# Patient Record
Sex: FEMALE | Race: WHITE | NOT HISPANIC OR LATINO | Employment: OTHER | RURAL
[De-identification: names, ages, dates, MRNs, and addresses within clinical notes are randomized per-mention and may not be internally consistent; named-entity substitution may affect disease eponyms.]

---

## 2017-02-14 ENCOUNTER — HISTORICAL (OUTPATIENT)
Dept: ADMINISTRATIVE | Facility: HOSPITAL | Age: 57
End: 2017-02-14

## 2017-02-15 LAB
LAB AP CLINICAL INFORMATION: NORMAL
LAB AP COMMENTS: NORMAL
LAB AP DIAGNOSIS - HISTORICAL: NORMAL
LAB AP GROSS PATHOLOGY - HISTORICAL: NORMAL
LAB AP SPECIMEN SUBMITTED - HISTORICAL: NORMAL

## 2021-01-18 ENCOUNTER — HISTORICAL (OUTPATIENT)
Dept: ADMINISTRATIVE | Facility: HOSPITAL | Age: 61
End: 2021-01-18

## 2021-08-13 DIAGNOSIS — M25.551 RIGHT HIP PAIN: Primary | ICD-10-CM

## 2021-08-18 ENCOUNTER — CLINICAL SUPPORT (OUTPATIENT)
Dept: REHABILITATION | Facility: HOSPITAL | Age: 61
End: 2021-08-18
Payer: COMMERCIAL

## 2021-08-18 DIAGNOSIS — M25.551 RIGHT HIP PAIN: ICD-10-CM

## 2021-08-18 DIAGNOSIS — M70.61 GREATER TROCHANTERIC BURSITIS, RIGHT: Primary | ICD-10-CM

## 2021-08-18 DIAGNOSIS — M76.31 ILIOTIBIAL BAND TENDONITIS OF RIGHT SIDE: ICD-10-CM

## 2021-08-18 DIAGNOSIS — M53.3 SACROILIAC JOINT DYSFUNCTION OF RIGHT SIDE: ICD-10-CM

## 2021-08-18 PROCEDURE — 97161 PT EVAL LOW COMPLEX 20 MIN: CPT | Mod: PN

## 2021-08-18 PROCEDURE — 97110 THERAPEUTIC EXERCISES: CPT | Mod: PN

## 2021-08-20 ENCOUNTER — CLINICAL SUPPORT (OUTPATIENT)
Dept: REHABILITATION | Facility: HOSPITAL | Age: 61
End: 2021-08-20
Payer: COMMERCIAL

## 2021-08-20 DIAGNOSIS — M53.3 SACROILIAC JOINT DYSFUNCTION OF RIGHT SIDE: ICD-10-CM

## 2021-08-20 DIAGNOSIS — M25.551 HIP PAIN, RIGHT: Primary | ICD-10-CM

## 2021-08-20 PROCEDURE — 97035 APP MDLTY 1+ULTRASOUND EA 15: CPT | Mod: PN,CQ

## 2021-08-20 PROCEDURE — 97110 THERAPEUTIC EXERCISES: CPT | Mod: PN,CQ

## 2021-08-26 ENCOUNTER — CLINICAL SUPPORT (OUTPATIENT)
Dept: REHABILITATION | Facility: HOSPITAL | Age: 61
End: 2021-08-26
Payer: COMMERCIAL

## 2021-08-26 DIAGNOSIS — M53.3 SACROILIAC JOINT DYSFUNCTION OF RIGHT SIDE: Primary | ICD-10-CM

## 2021-08-26 PROCEDURE — 97110 THERAPEUTIC EXERCISES: CPT | Mod: PN,CQ

## 2021-08-26 PROCEDURE — 97035 APP MDLTY 1+ULTRASOUND EA 15: CPT | Mod: PN,CQ

## 2021-08-27 ENCOUNTER — CLINICAL SUPPORT (OUTPATIENT)
Dept: REHABILITATION | Facility: HOSPITAL | Age: 61
End: 2021-08-27
Payer: COMMERCIAL

## 2021-08-27 DIAGNOSIS — M53.3 SACROILIAC JOINT DYSFUNCTION OF RIGHT SIDE: Primary | ICD-10-CM

## 2021-08-27 PROCEDURE — 97035 APP MDLTY 1+ULTRASOUND EA 15: CPT | Mod: PN,CQ

## 2021-08-27 PROCEDURE — 97110 THERAPEUTIC EXERCISES: CPT | Mod: PN,CQ

## 2021-08-31 ENCOUNTER — CLINICAL SUPPORT (OUTPATIENT)
Dept: REHABILITATION | Facility: HOSPITAL | Age: 61
End: 2021-08-31
Payer: COMMERCIAL

## 2021-08-31 DIAGNOSIS — M53.3 SACROILIAC JOINT DYSFUNCTION OF RIGHT SIDE: Primary | ICD-10-CM

## 2021-08-31 PROCEDURE — 97110 THERAPEUTIC EXERCISES: CPT | Mod: PN,CQ

## 2021-08-31 PROCEDURE — 97035 APP MDLTY 1+ULTRASOUND EA 15: CPT | Mod: PN,CQ

## 2021-09-02 ENCOUNTER — CLINICAL SUPPORT (OUTPATIENT)
Dept: REHABILITATION | Facility: HOSPITAL | Age: 61
End: 2021-09-02
Payer: COMMERCIAL

## 2021-09-02 DIAGNOSIS — M76.31 ILIOTIBIAL BAND TENDONITIS OF RIGHT SIDE: ICD-10-CM

## 2021-09-02 DIAGNOSIS — M25.551 RIGHT HIP PAIN: ICD-10-CM

## 2021-09-02 DIAGNOSIS — M70.61 GREATER TROCHANTERIC BURSITIS, RIGHT: ICD-10-CM

## 2021-09-02 DIAGNOSIS — M53.3 SACROILIAC JOINT DYSFUNCTION OF RIGHT SIDE: ICD-10-CM

## 2021-09-02 PROCEDURE — 97110 THERAPEUTIC EXERCISES: CPT | Mod: PN

## 2021-09-02 PROCEDURE — 97035 APP MDLTY 1+ULTRASOUND EA 15: CPT | Mod: PN

## 2021-09-08 ENCOUNTER — CLINICAL SUPPORT (OUTPATIENT)
Dept: REHABILITATION | Facility: HOSPITAL | Age: 61
End: 2021-09-08
Payer: COMMERCIAL

## 2021-09-08 DIAGNOSIS — M53.3 SACROILIAC JOINT DYSFUNCTION OF RIGHT SIDE: ICD-10-CM

## 2021-09-08 PROCEDURE — 97110 THERAPEUTIC EXERCISES: CPT | Mod: PN,CQ

## 2021-09-08 PROCEDURE — 97035 APP MDLTY 1+ULTRASOUND EA 15: CPT | Mod: PN,CQ

## 2021-09-09 ENCOUNTER — CLINICAL SUPPORT (OUTPATIENT)
Dept: REHABILITATION | Facility: HOSPITAL | Age: 61
End: 2021-09-09
Payer: COMMERCIAL

## 2021-09-09 DIAGNOSIS — M53.3 SACROILIAC JOINT DYSFUNCTION OF RIGHT SIDE: ICD-10-CM

## 2021-09-09 PROCEDURE — 97110 THERAPEUTIC EXERCISES: CPT | Mod: PN,CQ

## 2021-09-09 PROCEDURE — 97035 APP MDLTY 1+ULTRASOUND EA 15: CPT | Mod: PN,CQ

## 2021-10-07 PROBLEM — M53.3 SACROILIAC JOINT DYSFUNCTION OF RIGHT SIDE: Status: RESOLVED | Noted: 2021-08-18 | Resolved: 2021-10-07

## 2022-01-19 ENCOUNTER — HOSPITAL ENCOUNTER (OUTPATIENT)
Dept: RADIOLOGY | Facility: HOSPITAL | Age: 62
Discharge: HOME | End: 2022-01-19
Payer: COMMERCIAL

## 2022-01-19 VITALS — BODY MASS INDEX: 33.32 KG/M2 | WEIGHT: 200 LBS | HEIGHT: 65 IN

## 2022-01-19 DIAGNOSIS — Z12.31 VISIT FOR SCREENING MAMMOGRAM: ICD-10-CM

## 2022-01-19 PROCEDURE — 77067 SCR MAMMO BI INCL CAD: CPT | Mod: TC

## 2022-10-24 ENCOUNTER — APPOINTMENT (OUTPATIENT)
Dept: LAB | Facility: HOSPITAL | Age: 62
End: 2022-10-24
Attending: NURSE PRACTITIONER
Payer: COMMERCIAL

## 2022-10-24 ENCOUNTER — HOSPITAL ENCOUNTER (OUTPATIENT)
Dept: RADIOLOGY | Facility: HOSPITAL | Age: 62
Discharge: HOME | End: 2022-10-24
Attending: NURSE PRACTITIONER
Payer: COMMERCIAL

## 2022-10-24 DIAGNOSIS — R10.9 PAIN, ABDOMINAL: ICD-10-CM

## 2022-10-24 DIAGNOSIS — R11.10 VOMITING: ICD-10-CM

## 2022-10-24 PROCEDURE — 76705 ECHO EXAM OF ABDOMEN: CPT | Mod: TC

## 2022-10-25 ENCOUNTER — OFFICE VISIT (OUTPATIENT)
Dept: SURGERY | Facility: CLINIC | Age: 62
End: 2022-10-25
Payer: COMMERCIAL

## 2022-10-25 ENCOUNTER — HOSPITAL ENCOUNTER (INPATIENT)
Facility: HOSPITAL | Age: 62
LOS: 4 days | Discharge: HOME | DRG: 418 | End: 2022-10-29
Attending: FAMILY MEDICINE | Admitting: STUDENT IN AN ORGANIZED HEALTH CARE EDUCATION/TRAINING PROGRAM
Payer: COMMERCIAL

## 2022-10-25 DIAGNOSIS — K80.00 CALCULUS OF GALLBLADDER WITH ACUTE CHOLECYSTITIS WITHOUT OBSTRUCTION: ICD-10-CM

## 2022-10-25 DIAGNOSIS — N17.9 ACUTE RENAL FAILURE, UNSPECIFIED ACUTE RENAL FAILURE TYPE: ICD-10-CM

## 2022-10-25 DIAGNOSIS — K80.20 SYMPTOMATIC CHOLELITHIASIS: ICD-10-CM

## 2022-10-25 DIAGNOSIS — R11.2 NAUSEA AND VOMITING, UNSPECIFIED VOMITING TYPE: Primary | ICD-10-CM

## 2022-10-25 DIAGNOSIS — N17.9 ACUTE RENAL FAILURE, UNSPECIFIED ACUTE RENAL FAILURE TYPE: Primary | ICD-10-CM

## 2022-10-25 DIAGNOSIS — Z01.818 PRE-OP EVALUATION: ICD-10-CM

## 2022-10-25 DIAGNOSIS — R07.9 CHEST PAIN: ICD-10-CM

## 2022-10-25 PROBLEM — R19.7 DIARRHEA: Status: ACTIVE | Noted: 2022-10-25

## 2022-10-25 PROBLEM — E87.1 HYPONATREMIA: Status: ACTIVE | Noted: 2022-10-25

## 2022-10-25 PROBLEM — E66.9 OBESITY (BMI 30-39.9): Status: ACTIVE | Noted: 2022-10-25

## 2022-10-25 LAB
ALBUMIN SERPL BCP-MCNC: 3.7 G/DL (ref 3.5–5)
ALBUMIN/GLOB SERPL: 0.7 {RATIO}
ALP SERPL-CCNC: 131 U/L (ref 50–130)
ALT SERPL W P-5'-P-CCNC: 31 U/L (ref 13–56)
ANION GAP SERPL CALCULATED.3IONS-SCNC: 24 MMOL/L (ref 7–16)
AST SERPL W P-5'-P-CCNC: 22 U/L (ref 15–37)
BACTERIA #/AREA URNS HPF: ABNORMAL /HPF
BASOPHILS # BLD AUTO: 0.06 K/UL (ref 0–0.2)
BASOPHILS NFR BLD AUTO: 1 % (ref 0–1)
BASOPHILS NFR BLD MANUAL: 1 % (ref 0–1)
BILIRUB SERPL-MCNC: 0.3 MG/DL (ref ?–1.2)
BILIRUB UR QL STRIP: NEGATIVE
BUN SERPL-MCNC: 63 MG/DL (ref 7–18)
BUN/CREAT SERPL: 9 (ref 6–20)
CALCIUM SERPL-MCNC: 9.1 MG/DL (ref 8.5–10.1)
CHLORIDE SERPL-SCNC: 90 MMOL/L (ref 98–107)
CLARITY UR: ABNORMAL
CO2 SERPL-SCNC: 22 MMOL/L (ref 21–32)
COLOR UR: YELLOW
CREAT SERPL-MCNC: 7.15 MG/DL (ref 0.55–1.02)
CREAT UR-MCNC: 264 MG/DL (ref 28–219)
DIFFERENTIAL METHOD BLD: ABNORMAL
EGFR (NO RACE VARIABLE) (RUSH/TITUS): 6 ML/MIN/1.73M²
EOSINOPHIL # BLD AUTO: 0.01 K/UL (ref 0–0.5)
EOSINOPHIL NFR BLD AUTO: 0.2 % (ref 1–4)
ERYTHROCYTE [DISTWIDTH] IN BLOOD BY AUTOMATED COUNT: 12.4 % (ref 11.5–14.5)
GLOBULIN SER-MCNC: 5.4 G/DL (ref 2–4)
GLUCOSE SERPL-MCNC: 151 MG/DL (ref 74–106)
GLUCOSE UR STRIP-MCNC: NORMAL MG/DL
HCT VFR BLD AUTO: 47.4 % (ref 38–47)
HGB BLD-MCNC: 15.8 G/DL (ref 12–16)
IMM GRANULOCYTES # BLD AUTO: 0.04 K/UL (ref 0–0.04)
IMM GRANULOCYTES NFR BLD: 0.7 % (ref 0–0.4)
KETONES UR STRIP-SCNC: NEGATIVE MG/DL
LEUKOCYTE ESTERASE UR QL STRIP: NEGATIVE
LYMPHOCYTES # BLD AUTO: 1.01 K/UL (ref 1–4.8)
LYMPHOCYTES NFR BLD AUTO: 16.7 % (ref 27–41)
LYMPHOCYTES NFR BLD MANUAL: 13 % (ref 27–41)
MAGNESIUM SERPL-MCNC: 1.8 MG/DL (ref 1.7–2.3)
MCH RBC QN AUTO: 28.2 PG (ref 27–31)
MCHC RBC AUTO-ENTMCNC: 33.3 G/DL (ref 32–36)
MCV RBC AUTO: 84.5 FL (ref 80–96)
METAMYELOCYTES NFR BLD MANUAL: 1 %
MONOCYTES # BLD AUTO: 0.53 K/UL (ref 0–0.8)
MONOCYTES NFR BLD AUTO: 8.8 % (ref 2–6)
MONOCYTES NFR BLD MANUAL: 6 % (ref 2–6)
MPC BLD CALC-MCNC: 9.6 FL (ref 9.4–12.4)
MUCOUS, UA: ABNORMAL /LPF
NEUTROPHILS # BLD AUTO: 4.38 K/UL (ref 1.8–7.7)
NEUTROPHILS NFR BLD AUTO: 72.6 % (ref 53–65)
NEUTS BAND NFR BLD MANUAL: 23 % (ref 1–5)
NEUTS SEG NFR BLD MANUAL: 56 % (ref 50–62)
NITRITE UR QL STRIP: NEGATIVE
NRBC # BLD AUTO: 0 X10E3/UL
NRBC, AUTO (.00): 0 %
PH UR STRIP: 5 PH UNITS
PLATELET # BLD AUTO: 352 K/UL (ref 150–400)
PLATELET MORPHOLOGY: ABNORMAL
POTASSIUM SERPL-SCNC: 3.7 MMOL/L (ref 3.5–5.1)
PROT SERPL-MCNC: 9.1 G/DL (ref 6.4–8.2)
PROT UR QL STRIP: 50
RBC # BLD AUTO: 5.61 M/UL (ref 4.2–5.4)
RBC # UR STRIP: ABNORMAL /UL
RBC #/AREA URNS HPF: 13 /HPF
RBC MORPH BLD: NORMAL
SODIUM SERPL-SCNC: 132 MMOL/L (ref 136–145)
SODIUM UR-SCNC: 23 MMOL/L (ref 40–220)
SP GR UR STRIP: 1.02
SQUAMOUS #/AREA URNS LPF: ABNORMAL /HPF
TSH SERPL DL<=0.005 MIU/L-ACNC: 1.57 UIU/ML (ref 0.36–3.74)
UROBILINOGEN UR STRIP-ACNC: NORMAL MG/DL
WBC # BLD AUTO: 6.03 K/UL (ref 4.5–11)
WBC #/AREA URNS HPF: 1 /HPF

## 2022-10-25 PROCEDURE — 83735 ASSAY OF MAGNESIUM: CPT | Performed by: FAMILY MEDICINE

## 2022-10-25 PROCEDURE — 82570 ASSAY OF URINE CREATININE: CPT | Performed by: STUDENT IN AN ORGANIZED HEALTH CARE EDUCATION/TRAINING PROGRAM

## 2022-10-25 PROCEDURE — 93010 EKG 12-LEAD: ICD-10-PCS | Mod: ,,, | Performed by: STUDENT IN AN ORGANIZED HEALTH CARE EDUCATION/TRAINING PROGRAM

## 2022-10-25 PROCEDURE — 51798 US URINE CAPACITY MEASURE: CPT

## 2022-10-25 PROCEDURE — 36415 COLL VENOUS BLD VENIPUNCTURE: CPT | Performed by: STUDENT IN AN ORGANIZED HEALTH CARE EDUCATION/TRAINING PROGRAM

## 2022-10-25 PROCEDURE — 11000001 HC ACUTE MED/SURG PRIVATE ROOM

## 2022-10-25 PROCEDURE — 99204 PR OFFICE/OUTPT VISIT, NEW, LEVL IV, 45-59 MIN: ICD-10-PCS | Mod: S$PBB,,, | Performed by: STUDENT IN AN ORGANIZED HEALTH CARE EDUCATION/TRAINING PROGRAM

## 2022-10-25 PROCEDURE — 99213 OFFICE O/P EST LOW 20 MIN: CPT | Mod: PBBFAC,25 | Performed by: STUDENT IN AN ORGANIZED HEALTH CARE EDUCATION/TRAINING PROGRAM

## 2022-10-25 PROCEDURE — 63600175 PHARM REV CODE 636 W HCPCS: Performed by: STUDENT IN AN ORGANIZED HEALTH CARE EDUCATION/TRAINING PROGRAM

## 2022-10-25 PROCEDURE — 25000003 PHARM REV CODE 250: Performed by: STUDENT IN AN ORGANIZED HEALTH CARE EDUCATION/TRAINING PROGRAM

## 2022-10-25 PROCEDURE — 99284 PR EMERGENCY DEPT VISIT,LEVEL IV: ICD-10-PCS | Mod: ,,, | Performed by: FAMILY MEDICINE

## 2022-10-25 PROCEDURE — 85025 COMPLETE CBC W/AUTO DIFF WBC: CPT | Performed by: FAMILY MEDICINE

## 2022-10-25 PROCEDURE — 96372 THER/PROPH/DIAG INJ SC/IM: CPT

## 2022-10-25 PROCEDURE — 63600175 PHARM REV CODE 636 W HCPCS: Performed by: FAMILY MEDICINE

## 2022-10-25 PROCEDURE — 99285 EMERGENCY DEPT VISIT HI MDM: CPT | Mod: 25

## 2022-10-25 PROCEDURE — 84300 ASSAY OF URINE SODIUM: CPT | Performed by: STUDENT IN AN ORGANIZED HEALTH CARE EDUCATION/TRAINING PROGRAM

## 2022-10-25 PROCEDURE — 99284 EMERGENCY DEPT VISIT MOD MDM: CPT | Mod: ,,, | Performed by: FAMILY MEDICINE

## 2022-10-25 PROCEDURE — 25000003 PHARM REV CODE 250: Performed by: FAMILY MEDICINE

## 2022-10-25 PROCEDURE — 93005 ELECTROCARDIOGRAM TRACING: CPT

## 2022-10-25 PROCEDURE — 99223 PR INITIAL HOSPITAL CARE,LEVL III: ICD-10-PCS | Mod: ,,, | Performed by: STUDENT IN AN ORGANIZED HEALTH CARE EDUCATION/TRAINING PROGRAM

## 2022-10-25 PROCEDURE — 81001 URINALYSIS AUTO W/SCOPE: CPT | Performed by: FAMILY MEDICINE

## 2022-10-25 PROCEDURE — 96375 TX/PRO/DX INJ NEW DRUG ADDON: CPT

## 2022-10-25 PROCEDURE — 36415 COLL VENOUS BLD VENIPUNCTURE: CPT | Performed by: FAMILY MEDICINE

## 2022-10-25 PROCEDURE — 99204 OFFICE O/P NEW MOD 45 MIN: CPT | Mod: S$PBB,,, | Performed by: STUDENT IN AN ORGANIZED HEALTH CARE EDUCATION/TRAINING PROGRAM

## 2022-10-25 PROCEDURE — 96361 HYDRATE IV INFUSION ADD-ON: CPT

## 2022-10-25 PROCEDURE — 96374 THER/PROPH/DIAG INJ IV PUSH: CPT

## 2022-10-25 PROCEDURE — 84443 ASSAY THYROID STIM HORMONE: CPT | Performed by: STUDENT IN AN ORGANIZED HEALTH CARE EDUCATION/TRAINING PROGRAM

## 2022-10-25 PROCEDURE — 80053 COMPREHEN METABOLIC PANEL: CPT | Performed by: FAMILY MEDICINE

## 2022-10-25 PROCEDURE — 93010 ELECTROCARDIOGRAM REPORT: CPT | Mod: ,,, | Performed by: STUDENT IN AN ORGANIZED HEALTH CARE EDUCATION/TRAINING PROGRAM

## 2022-10-25 PROCEDURE — 99223 1ST HOSP IP/OBS HIGH 75: CPT | Mod: ,,, | Performed by: STUDENT IN AN ORGANIZED HEALTH CARE EDUCATION/TRAINING PROGRAM

## 2022-10-25 RX ORDER — MORPHINE SULFATE 4 MG/ML
4 INJECTION, SOLUTION INTRAMUSCULAR; INTRAVENOUS
Status: COMPLETED | OUTPATIENT
Start: 2022-10-25 | End: 2022-10-25

## 2022-10-25 RX ORDER — MONTELUKAST SODIUM 10 MG/1
TABLET ORAL
COMMUNITY
Start: 2022-08-07

## 2022-10-25 RX ORDER — HEPARIN SODIUM 5000 [USP'U]/ML
5000 INJECTION, SOLUTION INTRAVENOUS; SUBCUTANEOUS EVERY 8 HOURS
Status: DISCONTINUED | OUTPATIENT
Start: 2022-10-25 | End: 2022-10-29 | Stop reason: HOSPADM

## 2022-10-25 RX ORDER — SODIUM CHLORIDE, SODIUM LACTATE, POTASSIUM CHLORIDE, CALCIUM CHLORIDE 600; 310; 30; 20 MG/100ML; MG/100ML; MG/100ML; MG/100ML
INJECTION, SOLUTION INTRAVENOUS CONTINUOUS
Status: DISPENSED | OUTPATIENT
Start: 2022-10-25 | End: 2022-10-26

## 2022-10-25 RX ORDER — ATORVASTATIN CALCIUM 20 MG/1
20 TABLET, FILM COATED ORAL DAILY
Status: DISCONTINUED | OUTPATIENT
Start: 2022-10-25 | End: 2022-10-29 | Stop reason: HOSPADM

## 2022-10-25 RX ORDER — GLUCAGON 1 MG
1 KIT INJECTION
Status: DISCONTINUED | OUTPATIENT
Start: 2022-10-25 | End: 2022-10-29 | Stop reason: HOSPADM

## 2022-10-25 RX ORDER — FAMOTIDINE 20 MG/1
20 TABLET, FILM COATED ORAL 2 TIMES DAILY
COMMUNITY
Start: 2022-10-14

## 2022-10-25 RX ORDER — ASPIRIN 81 MG/1
81 TABLET ORAL DAILY
COMMUNITY

## 2022-10-25 RX ORDER — OXCARBAZEPINE 300 MG/1
150 TABLET, FILM COATED ORAL 2 TIMES DAILY
COMMUNITY

## 2022-10-25 RX ORDER — ONDANSETRON 2 MG/ML
4 INJECTION INTRAMUSCULAR; INTRAVENOUS
Status: COMPLETED | OUTPATIENT
Start: 2022-10-25 | End: 2022-10-25

## 2022-10-25 RX ORDER — CITALOPRAM 40 MG/1
TABLET, FILM COATED ORAL
COMMUNITY
Start: 2022-09-22 | End: 2023-06-05 | Stop reason: SDUPTHER

## 2022-10-25 RX ORDER — IPRATROPIUM BROMIDE 42 UG/1
SPRAY, METERED NASAL
COMMUNITY
Start: 2022-08-07 | End: 2024-01-31

## 2022-10-25 RX ORDER — IBUPROFEN 200 MG
24 TABLET ORAL
Status: DISCONTINUED | OUTPATIENT
Start: 2022-10-25 | End: 2022-10-29 | Stop reason: HOSPADM

## 2022-10-25 RX ORDER — MONTELUKAST SODIUM 10 MG/1
10 TABLET ORAL DAILY
Status: DISCONTINUED | OUTPATIENT
Start: 2022-10-25 | End: 2022-10-29 | Stop reason: HOSPADM

## 2022-10-25 RX ORDER — ONDANSETRON 2 MG/ML
4 INJECTION INTRAMUSCULAR; INTRAVENOUS EVERY 6 HOURS PRN
Status: DISCONTINUED | OUTPATIENT
Start: 2022-10-25 | End: 2022-10-29 | Stop reason: HOSPADM

## 2022-10-25 RX ORDER — LEVOTHYROXINE SODIUM 150 UG/1
150 TABLET ORAL
Status: DISCONTINUED | OUTPATIENT
Start: 2022-10-26 | End: 2022-10-29 | Stop reason: HOSPADM

## 2022-10-25 RX ORDER — LOSARTAN POTASSIUM AND HYDROCHLOROTHIAZIDE 25; 100 MG/1; MG/1
TABLET ORAL
COMMUNITY
Start: 2022-08-11 | End: 2023-06-05 | Stop reason: SDUPTHER

## 2022-10-25 RX ORDER — IBUPROFEN 200 MG
16 TABLET ORAL
Status: DISCONTINUED | OUTPATIENT
Start: 2022-10-25 | End: 2022-10-29 | Stop reason: HOSPADM

## 2022-10-25 RX ORDER — CITALOPRAM 20 MG/1
40 TABLET, FILM COATED ORAL DAILY
Status: DISCONTINUED | OUTPATIENT
Start: 2022-10-25 | End: 2022-10-29 | Stop reason: HOSPADM

## 2022-10-25 RX ORDER — SODIUM CHLORIDE 0.9 % (FLUSH) 0.9 %
10 SYRINGE (ML) INJECTION EVERY 12 HOURS PRN
Status: DISCONTINUED | OUTPATIENT
Start: 2022-10-25 | End: 2022-10-29 | Stop reason: HOSPADM

## 2022-10-25 RX ORDER — FAMOTIDINE 20 MG/1
20 TABLET, FILM COATED ORAL 2 TIMES DAILY
Status: DISCONTINUED | OUTPATIENT
Start: 2022-10-25 | End: 2022-10-26

## 2022-10-25 RX ORDER — OMEPRAZOLE 40 MG/1
40 CAPSULE, DELAYED RELEASE ORAL DAILY
COMMUNITY

## 2022-10-25 RX ORDER — ATORVASTATIN CALCIUM 20 MG/1
20 TABLET, FILM COATED ORAL DAILY
COMMUNITY
End: 2023-12-05

## 2022-10-25 RX ORDER — DEXTROAMPHETAMINE SACCHARATE, AMPHETAMINE ASPARTATE MONOHYDRATE, DEXTROAMPHETAMINE SULFATE AND AMPHETAMINE SULFATE 6.25; 6.25; 6.25; 6.25 MG/1; MG/1; MG/1; MG/1
CAPSULE, EXTENDED RELEASE ORAL EVERY MORNING
COMMUNITY
Start: 2022-10-06

## 2022-10-25 RX ORDER — MELOXICAM 15 MG/1
TABLET ORAL
Status: ON HOLD | COMMUNITY
Start: 2022-08-11 | End: 2022-10-29 | Stop reason: HOSPADM

## 2022-10-25 RX ORDER — ESTRADIOL 1 MG/1
1 TABLET ORAL DAILY
COMMUNITY
Start: 2022-08-24

## 2022-10-25 RX ORDER — OXCARBAZEPINE 150 MG/1
150 TABLET, FILM COATED ORAL 2 TIMES DAILY
Status: DISCONTINUED | OUTPATIENT
Start: 2022-10-25 | End: 2022-10-29 | Stop reason: HOSPADM

## 2022-10-25 RX ORDER — LEVOTHYROXINE SODIUM 150 UG/1
TABLET ORAL
COMMUNITY
Start: 2022-08-11 | End: 2023-12-05 | Stop reason: DRUGHIGH

## 2022-10-25 RX ORDER — PANTOPRAZOLE SODIUM 40 MG/1
40 TABLET, DELAYED RELEASE ORAL DAILY
Status: DISCONTINUED | OUTPATIENT
Start: 2022-10-25 | End: 2022-10-29 | Stop reason: HOSPADM

## 2022-10-25 RX ORDER — NALOXONE HCL 0.4 MG/ML
0.02 VIAL (ML) INJECTION
Status: DISCONTINUED | OUTPATIENT
Start: 2022-10-25 | End: 2022-10-29 | Stop reason: HOSPADM

## 2022-10-25 RX ADMIN — SODIUM CHLORIDE, POTASSIUM CHLORIDE, SODIUM LACTATE AND CALCIUM CHLORIDE: 600; 310; 30; 20 INJECTION, SOLUTION INTRAVENOUS at 12:10

## 2022-10-25 RX ADMIN — SODIUM CHLORIDE, POTASSIUM CHLORIDE, SODIUM LACTATE AND CALCIUM CHLORIDE: 600; 310; 30; 20 INJECTION, SOLUTION INTRAVENOUS at 08:10

## 2022-10-25 RX ADMIN — OXCARBAZEPINE 150 MG: 150 TABLET, FILM COATED ORAL at 12:10

## 2022-10-25 RX ADMIN — HEPARIN SODIUM 5000 UNITS: 5000 INJECTION INTRAVENOUS; SUBCUTANEOUS at 01:10

## 2022-10-25 RX ADMIN — CITALOPRAM HYDROBROMIDE 40 MG: 20 TABLET ORAL at 12:10

## 2022-10-25 RX ADMIN — FAMOTIDINE 20 MG: 20 TABLET, FILM COATED ORAL at 08:10

## 2022-10-25 RX ADMIN — OXCARBAZEPINE 150 MG: 150 TABLET, FILM COATED ORAL at 08:10

## 2022-10-25 RX ADMIN — PANTOPRAZOLE SODIUM 40 MG: 40 TABLET, DELAYED RELEASE ORAL at 12:10

## 2022-10-25 RX ADMIN — ONDANSETRON HYDROCHLORIDE 4 MG: 2 SOLUTION INTRAMUSCULAR; INTRAVENOUS at 11:10

## 2022-10-25 RX ADMIN — FAMOTIDINE 20 MG: 20 TABLET, FILM COATED ORAL at 12:10

## 2022-10-25 RX ADMIN — MORPHINE SULFATE 4 MG: 4 INJECTION, SOLUTION INTRAMUSCULAR; INTRAVENOUS at 11:10

## 2022-10-25 RX ADMIN — ATORVASTATIN CALCIUM 20 MG: 20 TABLET, FILM COATED ORAL at 12:10

## 2022-10-25 RX ADMIN — HEPARIN SODIUM 5000 UNITS: 5000 INJECTION INTRAVENOUS; SUBCUTANEOUS at 09:10

## 2022-10-25 RX ADMIN — MONTELUKAST SODIUM 10 MG: 10 TABLET, FILM COATED ORAL at 12:10

## 2022-10-25 RX ADMIN — SODIUM CHLORIDE 1000 ML: 9 INJECTION, SOLUTION INTRAVENOUS at 11:10

## 2022-10-25 RX ADMIN — SODIUM CHLORIDE 1000 ML: 9 INJECTION, SOLUTION INTRAVENOUS at 10:10

## 2022-10-25 NOTE — HPI
"62yof with pmh of hypothyroid, htn, depression, nicotine use who presents from surgery clinic for suspected cholecystitis.  Found to be in acute renal failure after lab evaluation.  She reports 5 days of intermittent vomiting and profuse watery diarrhea >6 times daily.  She is having some abdominal pain as well. Denies any fever chills, nothing like this has happened before.  She has no LUTS but has noticed a decrease in her UOP over the last 2 days. She feels thirsty and dehydrated but has had poor oral intake due to her illness. She does take meloxicam and has on a daily basis for "as long as I can remember".  Omeprazole as well. Bladder scan in ED revealed less than 100cc urine.  No dysuria, hematuria. Denies fever, chills, chest pain, palpitations, SOB. Per patient, BP well controlled at home.  ROS otherwise negative.   "

## 2022-10-25 NOTE — ED PROVIDER NOTES
Encounter Date: 10/25/2022       History     Chief Complaint   Patient presents with    Vomiting    Abnormal Lab     Patient is 62-year-old female, presents emergency department from Dr. Hernandez's office.  She was seen yesterday an outlying facility and had blood work and an ultrasound done.  She was found to have cholelithiasis.  She was also found have a creatinine greater than 4.  She denies any previous history of any CKD, but no additional labs are available to compare.  Patient states she has been throwing up and has not had much p.o. intake for the past several days.  Dr. Coburn fat of request that she be put in the hospital so he can remove her gallbladder in a few days        Review of patient's allergies indicates:  No Known Allergies  History reviewed. No pertinent past medical history.  Past Surgical History:   Procedure Laterality Date    HYSTERECTOMY      LAPAROSCOPIC CHOLECYSTECTOMY N/A 10/27/2022    Procedure: CHOLECYSTECTOMY, LAPAROSCOPIC;  Surgeon: Maxime Hernandez DO;  Location: [facility];  Service: General;  Laterality: N/A;    OOPHORECTOMY       History reviewed. No pertinent family history.  Social History     Tobacco Use    Smoking status: Every Day     Types: Vaping with nicotine    Smokeless tobacco: Never   Substance Use Topics    Alcohol use: Never    Drug use: Never     Review of Systems   Constitutional:  Negative for fever.   Gastrointestinal:  Positive for abdominal pain, nausea and vomiting.   All other systems reviewed and are negative.    Physical Exam     Initial Vitals [10/25/22 1038]   BP Pulse Resp Temp SpO2   109/69 82 17 97.7 °F (36.5 °C) 98 %      MAP       --         Physical Exam    Vitals reviewed.  Constitutional: She appears well-developed and well-nourished.   HENT:   Head: Normocephalic.   Eyes: Pupils are equal, round, and reactive to light.   Cardiovascular:  Normal rate, regular rhythm and normal heart sounds.           Pulmonary/Chest: Breath sounds normal. No  respiratory distress. She has no wheezes. She has no rales.   Abdominal: Abdomen is soft. Bowel sounds are normal. She exhibits no distension. There is abdominal tenderness (right upper quadrant tenderness, positive Hernandez sign).     Neurological: She is alert and oriented to person, place, and time.   Psychiatric: She has a normal mood and affect.       Medical Screening Exam   See Full Note    ED Course   Procedures  Labs Reviewed   COMPREHENSIVE METABOLIC PANEL - Abnormal; Notable for the following components:       Result Value    Sodium 132 (*)     Chloride 90 (*)     Anion Gap 24 (*)     Glucose 151 (*)     BUN 63 (*)     Creatinine 7.15 (*)     Total Protein 9.1 (*)     Globulin 5.4 (*)     Alk Phos 131 (*)     eGFR 6 (*)     All other components within normal limits   URINALYSIS, REFLEX TO URINE CULTURE - Abnormal; Notable for the following components:    Protein, UA 50 (*)     Blood, UA Small (*)     All other components within normal limits   CBC WITH DIFFERENTIAL - Abnormal; Notable for the following components:    RBC 5.61 (*)     Hematocrit 47.4 (*)     Neutrophils % 72.6 (*)     Lymphocytes % 16.7 (*)     Monocytes % 8.8 (*)     Eosinophils % 0.2 (*)     Immature Granulocytes % 0.7 (*)     All other components within normal limits   MANUAL DIFFERENTIAL - Abnormal; Notable for the following components:    Bands, Man % 23 (*)     Lymphocytes, Man % 13 (*)     Platelet Morphology Platelet Clumping (*)     All other components within normal limits   URINALYSIS, MICROSCOPIC - Abnormal; Notable for the following components:    RBC, UA 13 (*)     Bacteria, UA Occasional (*)     Squamous Epithelial Cells, UA Occasional (*)     Mucous Occasional (*)     All other components within normal limits   MAGNESIUM - Normal   TSH - Normal   CBC W/ AUTO DIFFERENTIAL    Narrative:     The following orders were created for panel order CBC auto differential.  Procedure                               Abnormality          Status                     ---------                               -----------         ------                     CBC with Differential[[id number]]        Abnormal            Final result               Manual Differential[[id number]]          Abnormal            Final result                 Please view results for these tests on the individual orders.        ECG Results              EKG 12-lead (In process)  Result time 10/25/22 12:03:43      In process by Interface, Lab In [facility] (10/25/22 12:03:43)                   Narrative:    Test Reason : Z01.818,    Vent. Rate : 083 BPM     Atrial Rate : 083 BPM     P-R Int : 160 ms          QRS Dur : 086 ms      QT Int : 466 ms       P-R-T Axes : 049 013 020 degrees     QTc Int : 547 ms    Sinus rhythm with Premature atrial complexes  Nonspecific ST abnormality  Prolonged QT  Abnormal ECG  No previous ECGs available    Referred By: [email or web address]   SELF           Confirmed By:                                   Imaging Results              X-Ray Chest AP Portable (Final result)  Result time 10/25/22 11:20:00      Final result by Ahmet Moore MD (10/25/22 11:20:00)                   Impression:      No acute findings.      Electronically signed by: Ahmet Moore  Date:    10/25/2022  Time:    11:20               Narrative:    EXAMINATION:  XR CHEST AP PORTABLE    CLINICAL HISTORY:  Encounter for other preprocedural examination    TECHNIQUE:  Single frontal view of the chest was performed.    COMPARISON:  None    FINDINGS:  Heart size normal. Lungs clear. No pneumothorax or pleural effusion.                                       Medications   lactated ringers infusion ( Intravenous New Bag 10/25/22 2021)   lactated ringers infusion ( Intravenous New Bag 10/26/22 2137)   potassium chloride SA CR tablet 40 mEq (40 mEq Oral Given 10/27/22 2136)   sodium chloride 0.9% bolus 1,000 mL (0 mLs Intravenous Stopped 10/25/22 1140)   morphine injection 4 mg (4 mg Intravenous Given 10/25/22 1108)    ondansetron injection 4 mg (4 mg Intravenous Given 10/25/22 1109)   sodium chloride 0.9% bolus 1,000 mL (0 mLs Intravenous Stopped 10/25/22 1237)                       Clinical Impression:   Final diagnoses:  [Z01.818] Pre-op evaluation  [K80.00] Calculus of gallbladder with acute cholecystitis without obstruction        ED Disposition Condition    Admit Stable                Gloria Anguiano MD  11/02/22 2438

## 2022-10-25 NOTE — ASSESSMENT & PLAN NOTE
2/2 to dehydration most likely but chronic use of meloxicam confounding. Doubling of creatinine over 24 hrs makes this less likely.  Cr normal 4/2021.  FENA  Bladder scan without evidence of retention  UA pending  IVF for now, further work up pending UA and response to fluids

## 2022-10-25 NOTE — ASSESSMENT & PLAN NOTE
Body mass index is 30.29 kg/m². Morbid obesity complicates all aspects of disease management from diagnostic modalities to treatment. Weight loss encouraged and health benefits explained to patient.

## 2022-10-25 NOTE — H&P
"[facility] - Emergency Department  Hospital Medicine  History & Physical    Patient Name: Tamera Hernandez  MRN: [id number]  Patient Class: IP- Inpatient  Admission Date: 10/25/2022  Attending Physician: Jt Marroquin DO   Primary Care Provider: KATHY Juarez         Patient information was obtained from patient, spouse/SO and ER records.     Subjective:     Principal Problem:Acute renal failure    Chief Complaint:   Chief Complaint   Patient presents with    Vomiting    Abnormal Lab        HPI: 62yof with pmh of hypothyroid, htn, depression, nicotine use who presents from surgery clinic for suspected cholecystitis.  Found to be in acute renal failure after lab evaluation.  She reports 5 days of intermittent vomiting and profuse watery diarrhea >6 times daily.  She is having some abdominal pain as well. Denies any fever chills, nothing like this has happened before.  She has no LUTS but has noticed a decrease in her UOP over the last 2 days. She feels thirsty and dehydrated but has had poor oral intake due to her illness. She does take meloxicam and has on a daily basis for "as long as I can remember".  Omeprazole as well. Bladder scan in ED revealed less than 100cc urine.  No dysuria, hematuria. Denies fever, chills, chest pain, palpitations, SOB. Per patient, BP well controlled at home.  ROS otherwise negative.       History reviewed. No pertinent past medical history.    Past Surgical History:   Procedure Laterality Date    HYSTERECTOMY      OOPHORECTOMY         Review of patient's allergies indicates:  No Known Allergies    No current facility-administered medications on file prior to encounter.     Current Outpatient Medications on File Prior to Encounter   Medication Sig    aspirin (ECOTRIN) 81 MG EC tablet Take 81 mg by mouth once daily.    atorvastatin (LIPITOR) 20 MG tablet Take 20 mg by mouth once daily.    citalopram (CELEXA) 40 MG tablet     dextroamphetamine-amphetamine (ADDERALL " XR) 25 MG 24 hr capsule Take by mouth every morning.    estradioL (ESTRACE) 1 MG tablet Take 1 mg by mouth once daily.    EUTHYROX 150 mcg tablet     famotidine (PEPCID) 20 MG tablet Take 20 mg by mouth 2 (two) times daily.    ipratropium (ATROVENT) 42 mcg (0.06 %) nasal spray USE 2 SPRAY(S) IN EACH NOSTRIL 4 TIMES DAILY    losartan-hydrochlorothiazide 100-25 mg (HYZAAR) 100-25 mg per tablet     meloxicam (MOBIC) 15 MG tablet     montelukast (SINGULAIR) 10 mg tablet TAKE 1 TABLET BY MOUTH ONCE DAILY IN THE EVENING FOR 90 DAYS    omeprazole (PRILOSEC) 40 MG capsule Take 40 mg by mouth once daily.    OXcarbazepine (TRILEPTAL) 300 MG Tab Take 150 mg by mouth 2 (two) times daily.     Family History    None       Tobacco Use    Smoking status: Every Day     Types: Vaping with nicotine    Smokeless tobacco: Never   Substance and Sexual Activity    Alcohol use: Never    Drug use: Never    Sexual activity: Not Currently     Review of Systems   Constitutional:  Negative for chills, fatigue, fever and unexpected weight change.   HENT:  Negative for congestion, mouth sores and sore throat.    Eyes:  Negative for photophobia and visual disturbance.   Respiratory:  Negative for cough, chest tightness, shortness of breath and wheezing.    Cardiovascular:  Negative for chest pain, palpitations and leg swelling.   Gastrointestinal:  Positive for abdominal pain, diarrhea and vomiting. Negative for blood in stool and nausea.   Endocrine: Negative for cold intolerance and heat intolerance.   Genitourinary:  Positive for decreased urine volume. Negative for difficulty urinating, dysuria, frequency, hematuria and urgency.   Musculoskeletal:  Negative for arthralgias, back pain and myalgias.   Skin:  Negative for pallor and rash.   Neurological:  Negative for tremors, seizures, syncope, weakness, numbness and headaches.   Hematological:  Does not bruise/bleed easily.   Psychiatric/Behavioral:  Negative for agitation,  confusion, hallucinations and suicidal ideas.    Objective:     Vital Signs (Most Recent):  Temp: 97.7 °F (36.5 °C) (10/25/22 1038)  Pulse: 82 (10/25/22 1038)  Resp: 18 (10/25/22 1108)  BP: 109/69 (10/25/22 1038)  SpO2: 98 % (10/25/22 1038) Vital Signs (24h Range):  Temp:  [97.7 °F (36.5 °C)] 97.7 °F (36.5 °C)  Pulse:  [82] 82  Resp:  [17-18] 18  SpO2:  [98 %] 98 %  BP: (109)/(69) 109/69     Weight: 82.6 kg (182 lb)  Body mass index is 30.29 kg/m².    Physical Exam  Vitals reviewed.   Constitutional:       Appearance: Normal appearance.   HENT:      Head: Normocephalic and atraumatic.      Nose: Nose normal.   Eyes:      Extraocular Movements: Extraocular movements intact.      Conjunctiva/sclera: Conjunctivae normal.   Neck:      Trachea: Trachea normal.   Cardiovascular:      Rate and Rhythm: Normal rate and regular rhythm.      Pulses: Normal pulses.      Heart sounds: Normal heart sounds.   Pulmonary:      Effort: Pulmonary effort is normal.      Breath sounds: Normal breath sounds and air entry.   Abdominal:      General: Bowel sounds are normal.      Palpations: Abdomen is soft.      Tenderness: There is abdominal tenderness (to deep palpation).   Musculoskeletal:         General: Normal range of motion.      Cervical back: Neck supple.      Comments: Normal tone, moves all extremities   Skin:     General: Skin is warm and dry.      Capillary Refill: Capillary refill takes less than 2 seconds.   Neurological:      General: No focal deficit present.      Mental Status: She is alert and oriented to person, place, and time.      Cranial Nerves: Cranial nerves 2-12 are intact.      Comments: Grossly normal motor and sensory function without focal deficit appreciated.   Psychiatric:         Mood and Affect: Mood and affect normal.         Behavior: Behavior normal. Behavior is cooperative.         Thought Content: Thought content normal.           Significant Labs: All pertinent labs within the past 24 hours have  been reviewed.    Significant Imaging: I have reviewed all pertinent imaging results/findings within the past 24 hours.    Assessment/Plan:     * Acute renal failure  2/2 to dehydration most likely but chronic use of meloxicam confounding. Doubling of creatinine over 24 hrs makes this less likely.  Cr normal 4/2021.  FENA  Bladder scan without evidence of retention  UA pending  IVF for now, further work up pending UA and response to fluids      Nausea & vomiting  zofran prn        Diarrhea  cdiff pending      Obesity (BMI 30-39.9)  Body mass index is 30.29 kg/m². Morbid obesity complicates all aspects of disease management from diagnostic modalities to treatment. Weight loss encouraged and health benefits explained to patient.         Hyponatremia  Mild, trend      Cholelithiasis with acute cholecystitis  Consult surgery        VTE Risk Mitigation (From admission, onward)         Ordered     heparin (porcine) injection 5,000 Units  Every 8 hours         10/25/22 1154     IP VTE HIGH RISK PATIENT  Once         10/25/22 1154     Place sequential compression device  Until discontinued         10/25/22 1154                   Jt Marroquin DO  Department of Hospital Medicine   [facility] - Emergency Department

## 2022-10-25 NOTE — ED TRIAGE NOTES
Presents to ED from 's office for c/o vomiting that has been ongoing for a few days. Patient had labwork and US done that showed elevated creatinine and gallstones. Sent for admission.

## 2022-10-25 NOTE — PROGRESS NOTES
History & Physical    SUBJECTIVE:     History of Present Illness:  62-year-old  female presents to the clinic for evaluation for right upper quadrant abdominal pain, nausea and vomiting and dehydration.  Patient has been having increased epigastric abdominal pain with radiation the right upper quadrant for the past several days; associated with nausea and vomiting.  Patient went to [facility] yesterday and had ultrasound performed which showed gallstones, but no gallbladder wall thickening or pericholecystic fluid; common bile duct was normal in size.  Patient had blood work done and liver enzymes were mostly unremarkable with a normal bilirubin; her creatinine was 4.88 with a GFR of 9.  Patient presented to my clinic today and was very weak and felt really dehydrated.  Still complain of right upper quadrant abdominal pain, nausea and vomiting.  Denies any fevers or chills.    Chief Complaint   Patient presents with    Gall Bladder Problem       Review of patient's allergies indicates:  No Known Allergies    No current facility-administered medications for this visit.     Current Outpatient Medications   Medication Sig Dispense Refill    aspirin (ECOTRIN) 81 MG EC tablet Take 81 mg by mouth once daily.      atorvastatin (LIPITOR) 20 MG tablet Take 20 mg by mouth once daily.      citalopram (CELEXA) 40 MG tablet       dextroamphetamine-amphetamine (ADDERALL XR) 25 MG 24 hr capsule Take by mouth every morning.      estradioL (ESTRACE) 1 MG tablet Take 1 mg by mouth once daily.      EUTHYROX 150 mcg tablet       famotidine (PEPCID) 20 MG tablet Take 20 mg by mouth 2 (two) times daily.      ipratropium (ATROVENT) 42 mcg (0.06 %) nasal spray USE 2 SPRAY(S) IN EACH NOSTRIL 4 TIMES DAILY      losartan-hydrochlorothiazide 100-25 mg (HYZAAR) 100-25 mg per tablet       meloxicam (MOBIC) 15 MG tablet       montelukast (SINGULAIR) 10 mg tablet TAKE 1 TABLET BY MOUTH ONCE DAILY IN THE EVENING FOR 90 DAYS       omeprazole (PRILOSEC) 40 MG capsule Take 40 mg by mouth once daily.      OXcarbazepine (TRILEPTAL) 300 MG Tab Take 150 mg by mouth 2 (two) times daily.       Facility-Administered Medications Ordered in Other Visits   Medication Dose Route Frequency Provider Last Rate Last Admin    atorvastatin tablet 20 mg  20 mg Oral Daily Jt Marroquin, DO   20 mg at 10/25/22 1223    citalopram tablet 40 mg  40 mg Oral Daily Jt Marroquin DO   40 mg at 10/25/22 1223    dextrose 50% injection 12.5 g  12.5 g Intravenous PRN Jt Marroquin, DO        dextrose 50% injection 25 g  25 g Intravenous PRN Jt Marroquin, DO        famotidine tablet 20 mg  20 mg Oral BID Jt Marroquin DO   20 mg at 10/25/22 1223    glucagon (human recombinant) injection 1 mg  1 mg Intramuscular PRN Jt Marroquin, DO        glucose chewable tablet 16 g  16 g Oral PRN Jt Marroquin, DO        glucose chewable tablet 24 g  24 g Oral PRN Jt Marroquin DO        heparin (porcine) injection 5,000 Units  5,000 Units Subcutaneous Q8H Jt Marroquin, DO   5,000 Units at 10/25/22 1353    lactated ringers infusion   Intravenous Continuous Jt Marroquin  mL/hr at 10/25/22 1344 Rate Change at 10/25/22 1344    [START ON 10/26/2022] levothyroxine tablet 150 mcg  150 mcg Oral Before breakfast Jt Marroquin DO        montelukast tablet 10 mg  10 mg Oral Daily Jt Marroquin DO   10 mg at 10/25/22 1223    naloxone 0.4 mg/mL injection 0.02 mg  0.02 mg Intravenous PRN Jt Marrqouin DO        ondansetron injection 4 mg  4 mg Intravenous Q6H PRN Jt Marroquin DO        OXcarbazepine tablet 150 mg  150 mg Oral BID Jt Marroquin DO   150 mg at 10/25/22 1223    pantoprazole EC tablet 40 mg  40 mg Oral Daily Jt Marroquin, DO   40 mg at 10/25/22 1223    sodium chloride 0.9% flush 10 mL  10 mL Intravenous Q12H PRN Jt Marroquin DO           No past medical history on file.  Past Surgical History:   Procedure Laterality Date    HYSTERECTOMY      OOPHORECTOMY       No  family history on file.  Social History     Tobacco Use    Smoking status: Every Day     Types: Vaping with nicotine    Smokeless tobacco: Never   Substance Use Topics    Alcohol use: Never    Drug use: Never        Review of Systems:  Review of Systems   Constitutional: Negative.  Negative for fatigue and unexpected weight change.   HENT: Negative.  Negative for trouble swallowing.    Eyes: Negative.    Respiratory: Negative.  Negative for chest tightness and shortness of breath.    Cardiovascular: Negative.  Negative for chest pain.   Gastrointestinal:  Positive for abdominal pain, nausea and vomiting. Negative for blood in stool.   Endocrine: Negative.    Genitourinary: Negative.  Negative for hematuria.   Musculoskeletal: Negative.  Negative for back pain and myalgias.   Neurological: Negative.  Negative for dizziness, speech difficulty, weakness and light-headedness.   Psychiatric/Behavioral: Negative.  Negative for agitation and behavioral problems.      OBJECTIVE:     Vital Signs (Most Recent)              Physical Exam:  Physical Exam  Constitutional:       General: She is not in acute distress.     Appearance: Normal appearance.   HENT:      Head: Normocephalic and atraumatic.      Nose: Nose normal.   Eyes:      General: No scleral icterus.     Pupils: Pupils are equal, round, and reactive to light.   Cardiovascular:      Rate and Rhythm: Normal rate.   Pulmonary:      Effort: Pulmonary effort is normal. No respiratory distress.      Breath sounds: Normal breath sounds.   Abdominal:      General: There is no distension.      Palpations: Abdomen is soft.      Tenderness: There is abdominal tenderness in the right upper quadrant. There is no guarding. Negative signs include Hernandez's sign.   Musculoskeletal:         General: Normal range of motion.      Cervical back: Normal range of motion and neck supple.   Skin:     General: Skin is warm.      Coloration: Skin is not jaundiced.   Neurological:       General: No focal deficit present.      Mental Status: She is alert and oriented to person, place, and time.      Cranial Nerves: No cranial nerve deficit.   Psychiatric:         Mood and Affect: Mood normal.       ASSESSMENT/PLAN:     Acute renal failure, likely secondary to dehydration; symptomatic cholelithiasis    PLAN:Plan     Will send the patient to the ER to have labs drawn and for IV hydration.  Patient needs to be admitted to the hospital for medical management, rehydration and monitoring of renal function.  During her hospital stay, we will perform a laparoscopic cholecystectomy once her kidney function has improved.  I spoke with Dr. Gloria Anguiano and she is aware of the patient.

## 2022-10-25 NOTE — SUBJECTIVE & OBJECTIVE
History reviewed. No pertinent past medical history.    Past Surgical History:   Procedure Laterality Date    HYSTERECTOMY      OOPHORECTOMY         Review of patient's allergies indicates:  No Known Allergies    No current facility-administered medications on file prior to encounter.     Current Outpatient Medications on File Prior to Encounter   Medication Sig    aspirin (ECOTRIN) 81 MG EC tablet Take 81 mg by mouth once daily.    atorvastatin (LIPITOR) 20 MG tablet Take 20 mg by mouth once daily.    citalopram (CELEXA) 40 MG tablet     dextroamphetamine-amphetamine (ADDERALL XR) 25 MG 24 hr capsule Take by mouth every morning.    estradioL (ESTRACE) 1 MG tablet Take 1 mg by mouth once daily.    EUTHYROX 150 mcg tablet     famotidine (PEPCID) 20 MG tablet Take 20 mg by mouth 2 (two) times daily.    ipratropium (ATROVENT) 42 mcg (0.06 %) nasal spray USE 2 SPRAY(S) IN EACH NOSTRIL 4 TIMES DAILY    losartan-hydrochlorothiazide 100-25 mg (HYZAAR) 100-25 mg per tablet     meloxicam (MOBIC) 15 MG tablet     montelukast (SINGULAIR) 10 mg tablet TAKE 1 TABLET BY MOUTH ONCE DAILY IN THE EVENING FOR 90 DAYS    omeprazole (PRILOSEC) 40 MG capsule Take 40 mg by mouth once daily.    OXcarbazepine (TRILEPTAL) 300 MG Tab Take 150 mg by mouth 2 (two) times daily.     Family History    None       Tobacco Use    Smoking status: Every Day     Types: Vaping with nicotine    Smokeless tobacco: Never   Substance and Sexual Activity    Alcohol use: Never    Drug use: Never    Sexual activity: Not Currently     Review of Systems   Constitutional:  Negative for chills, fatigue, fever and unexpected weight change.   HENT:  Negative for congestion, mouth sores and sore throat.    Eyes:  Negative for photophobia and visual disturbance.   Respiratory:  Negative for cough, chest tightness, shortness of breath and wheezing.    Cardiovascular:  Negative for chest pain, palpitations and leg swelling.   Gastrointestinal:  Positive for abdominal  pain, diarrhea and vomiting. Negative for blood in stool and nausea.   Endocrine: Negative for cold intolerance and heat intolerance.   Genitourinary:  Positive for decreased urine volume. Negative for difficulty urinating, dysuria, frequency, hematuria and urgency.   Musculoskeletal:  Negative for arthralgias, back pain and myalgias.   Skin:  Negative for pallor and rash.   Neurological:  Negative for tremors, seizures, syncope, weakness, numbness and headaches.   Hematological:  Does not bruise/bleed easily.   Psychiatric/Behavioral:  Negative for agitation, confusion, hallucinations and suicidal ideas.    Objective:     Vital Signs (Most Recent):  Temp: 97.7 °F (36.5 °C) (10/25/22 1038)  Pulse: 82 (10/25/22 1038)  Resp: 18 (10/25/22 1108)  BP: 109/69 (10/25/22 1038)  SpO2: 98 % (10/25/22 1038) Vital Signs (24h Range):  Temp:  [97.7 °F (36.5 °C)] 97.7 °F (36.5 °C)  Pulse:  [82] 82  Resp:  [17-18] 18  SpO2:  [98 %] 98 %  BP: (109)/(69) 109/69     Weight: 82.6 kg (182 lb)  Body mass index is 30.29 kg/m².    Physical Exam  Vitals reviewed.   Constitutional:       Appearance: Normal appearance.   HENT:      Head: Normocephalic and atraumatic.      Nose: Nose normal.   Eyes:      Extraocular Movements: Extraocular movements intact.      Conjunctiva/sclera: Conjunctivae normal.   Neck:      Trachea: Trachea normal.   Cardiovascular:      Rate and Rhythm: Normal rate and regular rhythm.      Pulses: Normal pulses.      Heart sounds: Normal heart sounds.   Pulmonary:      Effort: Pulmonary effort is normal.      Breath sounds: Normal breath sounds and air entry.   Abdominal:      General: Bowel sounds are normal.      Palpations: Abdomen is soft.      Tenderness: There is abdominal tenderness (to deep palpation).   Musculoskeletal:         General: Normal range of motion.      Cervical back: Neck supple.      Comments: Normal tone, moves all extremities   Skin:     General: Skin is warm and dry.      Capillary Refill:  Capillary refill takes less than 2 seconds.   Neurological:      General: No focal deficit present.      Mental Status: She is alert and oriented to person, place, and time.      Cranial Nerves: Cranial nerves 2-12 are intact.      Comments: Grossly normal motor and sensory function without focal deficit appreciated.   Psychiatric:         Mood and Affect: Mood and affect normal.         Behavior: Behavior normal. Behavior is cooperative.         Thought Content: Thought content normal.           Significant Labs: All pertinent labs within the past 24 hours have been reviewed.    Significant Imaging: I have reviewed all pertinent imaging results/findings within the past 24 hours.

## 2022-10-26 LAB
ALBUMIN SERPL BCP-MCNC: 3.1 G/DL (ref 3.5–5)
ANION GAP SERPL CALCULATED.3IONS-SCNC: 17 MMOL/L (ref 7–16)
ANION GAP SERPL CALCULATED.3IONS-SCNC: 18 MMOL/L (ref 7–16)
BASOPHILS # BLD AUTO: 0.03 K/UL (ref 0–0.2)
BASOPHILS NFR BLD AUTO: 0.7 % (ref 0–1)
BUN SERPL-MCNC: 62 MG/DL (ref 7–18)
BUN SERPL-MCNC: 64 MG/DL (ref 7–18)
BUN/CREAT SERPL: 14 (ref 6–20)
BUN/CREAT SERPL: 16 (ref 6–20)
CALCIUM SERPL-MCNC: 8.2 MG/DL (ref 8.5–10.1)
CALCIUM SERPL-MCNC: 8.3 MG/DL (ref 8.5–10.1)
CHLORIDE SERPL-SCNC: 96 MMOL/L (ref 98–107)
CHLORIDE SERPL-SCNC: 96 MMOL/L (ref 98–107)
CO2 SERPL-SCNC: 23 MMOL/L (ref 21–32)
CO2 SERPL-SCNC: 24 MMOL/L (ref 21–32)
CREAT SERPL-MCNC: 3.92 MG/DL (ref 0.55–1.02)
CREAT SERPL-MCNC: 4.52 MG/DL (ref 0.55–1.02)
DIFFERENTIAL METHOD BLD: ABNORMAL
EGFR (NO RACE VARIABLE) (RUSH/TITUS): 10 ML/MIN/1.73M²
EGFR (NO RACE VARIABLE) (RUSH/TITUS): 12 ML/MIN/1.73M²
EOSINOPHIL # BLD AUTO: 0.04 K/UL (ref 0–0.5)
EOSINOPHIL NFR BLD AUTO: 1 % (ref 1–4)
ERYTHROCYTE [DISTWIDTH] IN BLOOD BY AUTOMATED COUNT: 12.7 % (ref 11.5–14.5)
FECAL LEUKOCYTES: NORMAL /HPF
GLUCOSE SERPL-MCNC: 73 MG/DL (ref 74–106)
GLUCOSE SERPL-MCNC: 82 MG/DL (ref 74–106)
HCT VFR BLD AUTO: 38.7 % (ref 38–47)
HGB BLD-MCNC: 12.9 G/DL (ref 12–16)
IMM GRANULOCYTES # BLD AUTO: 0.02 K/UL (ref 0–0.04)
IMM GRANULOCYTES NFR BLD: 0.5 % (ref 0–0.4)
LYMPHOCYTES # BLD AUTO: 0.83 K/UL (ref 1–4.8)
LYMPHOCYTES NFR BLD AUTO: 19.9 % (ref 27–41)
MAGNESIUM SERPL-MCNC: 1.7 MG/DL (ref 1.7–2.3)
MCH RBC QN AUTO: 28.6 PG (ref 27–31)
MCHC RBC AUTO-ENTMCNC: 33.3 G/DL (ref 32–36)
MCV RBC AUTO: 85.8 FL (ref 80–96)
MONOCYTES # BLD AUTO: 0.31 K/UL (ref 0–0.8)
MONOCYTES NFR BLD AUTO: 7.4 % (ref 2–6)
MPC BLD CALC-MCNC: 9.3 FL (ref 9.4–12.4)
NEUTROPHILS # BLD AUTO: 2.94 K/UL (ref 1.8–7.7)
NEUTROPHILS NFR BLD AUTO: 70.5 % (ref 53–65)
NRBC # BLD AUTO: 0 X10E3/UL
NRBC, AUTO (.00): 0 %
PHOSPHATE SERPL-MCNC: 3.6 MG/DL (ref 2.5–4.5)
PHOSPHATE SERPL-MCNC: 4.1 MG/DL (ref 2.5–4.5)
PLATELET # BLD AUTO: 247 K/UL (ref 150–400)
POTASSIUM SERPL-SCNC: 3.1 MMOL/L (ref 3.5–5.1)
POTASSIUM SERPL-SCNC: 3.3 MMOL/L (ref 3.5–5.1)
RBC # BLD AUTO: 4.51 M/UL (ref 4.2–5.4)
SODIUM SERPL-SCNC: 134 MMOL/L (ref 136–145)
SODIUM SERPL-SCNC: 134 MMOL/L (ref 136–145)
WBC # BLD AUTO: 4.17 K/UL (ref 4.5–11)

## 2022-10-26 PROCEDURE — 87209 SMEAR COMPLEX STAIN: CPT | Performed by: NURSE PRACTITIONER

## 2022-10-26 PROCEDURE — 99222 1ST HOSP IP/OBS MODERATE 55: CPT | Mod: ,,, | Performed by: STUDENT IN AN ORGANIZED HEALTH CARE EDUCATION/TRAINING PROGRAM

## 2022-10-26 PROCEDURE — 25000003 PHARM REV CODE 250: Performed by: STUDENT IN AN ORGANIZED HEALTH CARE EDUCATION/TRAINING PROGRAM

## 2022-10-26 PROCEDURE — 94761 N-INVAS EAR/PLS OXIMETRY MLT: CPT

## 2022-10-26 PROCEDURE — 85025 COMPLETE CBC W/AUTO DIFF WBC: CPT | Performed by: STUDENT IN AN ORGANIZED HEALTH CARE EDUCATION/TRAINING PROGRAM

## 2022-10-26 PROCEDURE — 87493 C DIFF AMPLIFIED PROBE: CPT | Performed by: STUDENT IN AN ORGANIZED HEALTH CARE EDUCATION/TRAINING PROGRAM

## 2022-10-26 PROCEDURE — 36415 COLL VENOUS BLD VENIPUNCTURE: CPT | Performed by: STUDENT IN AN ORGANIZED HEALTH CARE EDUCATION/TRAINING PROGRAM

## 2022-10-26 PROCEDURE — 11000001 HC ACUTE MED/SURG PRIVATE ROOM

## 2022-10-26 PROCEDURE — 80069 RENAL FUNCTION PANEL: CPT | Performed by: STUDENT IN AN ORGANIZED HEALTH CARE EDUCATION/TRAINING PROGRAM

## 2022-10-26 PROCEDURE — 87177 OVA AND PARASITES SMEARS: CPT | Performed by: NURSE PRACTITIONER

## 2022-10-26 PROCEDURE — 63600175 PHARM REV CODE 636 W HCPCS: Performed by: STUDENT IN AN ORGANIZED HEALTH CARE EDUCATION/TRAINING PROGRAM

## 2022-10-26 PROCEDURE — 80048 BASIC METABOLIC PNL TOTAL CA: CPT | Performed by: STUDENT IN AN ORGANIZED HEALTH CARE EDUCATION/TRAINING PROGRAM

## 2022-10-26 PROCEDURE — 99232 SBSQ HOSP IP/OBS MODERATE 35: CPT | Mod: ,,, | Performed by: STUDENT IN AN ORGANIZED HEALTH CARE EDUCATION/TRAINING PROGRAM

## 2022-10-26 PROCEDURE — 89055 LEUKOCYTE ASSESSMENT FECAL: CPT | Performed by: NURSE PRACTITIONER

## 2022-10-26 PROCEDURE — 27000221 HC OXYGEN, UP TO 24 HOURS

## 2022-10-26 PROCEDURE — 83735 ASSAY OF MAGNESIUM: CPT | Performed by: STUDENT IN AN ORGANIZED HEALTH CARE EDUCATION/TRAINING PROGRAM

## 2022-10-26 PROCEDURE — 84100 ASSAY OF PHOSPHORUS: CPT | Performed by: STUDENT IN AN ORGANIZED HEALTH CARE EDUCATION/TRAINING PROGRAM

## 2022-10-26 PROCEDURE — 99232 PR SUBSEQUENT HOSPITAL CARE,LEVL II: ICD-10-PCS | Mod: ,,, | Performed by: STUDENT IN AN ORGANIZED HEALTH CARE EDUCATION/TRAINING PROGRAM

## 2022-10-26 PROCEDURE — 99222 PR INITIAL HOSPITAL CARE,LEVL II: ICD-10-PCS | Mod: ,,, | Performed by: STUDENT IN AN ORGANIZED HEALTH CARE EDUCATION/TRAINING PROGRAM

## 2022-10-26 RX ORDER — SODIUM CHLORIDE, SODIUM LACTATE, POTASSIUM CHLORIDE, CALCIUM CHLORIDE 600; 310; 30; 20 MG/100ML; MG/100ML; MG/100ML; MG/100ML
INJECTION, SOLUTION INTRAVENOUS CONTINUOUS
Status: DISPENSED | OUTPATIENT
Start: 2022-10-26 | End: 2022-10-26

## 2022-10-26 RX ORDER — ALUMINUM HYDROXIDE, MAGNESIUM HYDROXIDE, AND SIMETHICONE 2400; 240; 2400 MG/30ML; MG/30ML; MG/30ML
30 SUSPENSION ORAL EVERY 6 HOURS PRN
Status: DISCONTINUED | OUTPATIENT
Start: 2022-10-26 | End: 2022-10-29 | Stop reason: HOSPADM

## 2022-10-26 RX ORDER — ACETAMINOPHEN 500 MG
1000 TABLET ORAL EVERY 8 HOURS PRN
Status: DISCONTINUED | OUTPATIENT
Start: 2022-10-26 | End: 2022-10-29 | Stop reason: HOSPADM

## 2022-10-26 RX ADMIN — ONDANSETRON HYDROCHLORIDE 4 MG: 2 SOLUTION INTRAMUSCULAR; INTRAVENOUS at 04:10

## 2022-10-26 RX ADMIN — MONTELUKAST SODIUM 10 MG: 10 TABLET, FILM COATED ORAL at 09:10

## 2022-10-26 RX ADMIN — FAMOTIDINE 20 MG: 20 TABLET, FILM COATED ORAL at 09:10

## 2022-10-26 RX ADMIN — LEVOTHYROXINE SODIUM 150 MCG: 0.15 TABLET ORAL at 05:10

## 2022-10-26 RX ADMIN — CITALOPRAM HYDROBROMIDE 40 MG: 20 TABLET ORAL at 09:10

## 2022-10-26 RX ADMIN — HEPARIN SODIUM 5000 UNITS: 5000 INJECTION INTRAVENOUS; SUBCUTANEOUS at 05:10

## 2022-10-26 RX ADMIN — ALUMINUM HYDROXIDE, MAGNESIUM HYDROXIDE, AND DIMETHICONE 30 ML: 400; 400; 40 SUSPENSION ORAL at 03:10

## 2022-10-26 RX ADMIN — HEPARIN SODIUM 5000 UNITS: 5000 INJECTION INTRAVENOUS; SUBCUTANEOUS at 09:10

## 2022-10-26 RX ADMIN — OXCARBAZEPINE 150 MG: 150 TABLET, FILM COATED ORAL at 09:10

## 2022-10-26 RX ADMIN — ACETAMINOPHEN 1000 MG: 500 TABLET ORAL at 03:10

## 2022-10-26 RX ADMIN — SODIUM CHLORIDE, POTASSIUM CHLORIDE, SODIUM LACTATE AND CALCIUM CHLORIDE: 600; 310; 30; 20 INJECTION, SOLUTION INTRAVENOUS at 09:10

## 2022-10-26 RX ADMIN — HEPARIN SODIUM 5000 UNITS: 5000 INJECTION INTRAVENOUS; SUBCUTANEOUS at 03:10

## 2022-10-26 RX ADMIN — PANTOPRAZOLE SODIUM 40 MG: 40 TABLET, DELAYED RELEASE ORAL at 09:10

## 2022-10-26 RX ADMIN — ONDANSETRON HYDROCHLORIDE 4 MG: 2 SOLUTION INTRAMUSCULAR; INTRAVENOUS at 10:10

## 2022-10-26 RX ADMIN — ATORVASTATIN CALCIUM 20 MG: 20 TABLET, FILM COATED ORAL at 09:10

## 2022-10-26 NOTE — ASSESSMENT & PLAN NOTE
2/2 to dehydration most likely but chronic use of meloxicam confounding. Doubling of creatinine over 24 hrs makes this less likely.  Cr normal 4/2021.  FENA- pre renal  Bladder scan without evidence of retention  UA small blood- no microscopy, small protein  Continue IVF

## 2022-10-26 NOTE — SUBJECTIVE & OBJECTIVE
No current facility-administered medications on file prior to encounter.     Current Outpatient Medications on File Prior to Encounter   Medication Sig    aspirin (ECOTRIN) 81 MG EC tablet Take 81 mg by mouth once daily.    atorvastatin (LIPITOR) 20 MG tablet Take 20 mg by mouth once daily.    citalopram (CELEXA) 40 MG tablet     dextroamphetamine-amphetamine (ADDERALL XR) 25 MG 24 hr capsule Take by mouth every morning.    estradioL (ESTRACE) 1 MG tablet Take 1 mg by mouth once daily.    EUTHYROX 150 mcg tablet     famotidine (PEPCID) 20 MG tablet Take 20 mg by mouth 2 (two) times daily.    ipratropium (ATROVENT) 42 mcg (0.06 %) nasal spray USE 2 SPRAY(S) IN EACH NOSTRIL 4 TIMES DAILY    losartan-hydrochlorothiazide 100-25 mg (HYZAAR) 100-25 mg per tablet     meloxicam (MOBIC) 15 MG tablet     montelukast (SINGULAIR) 10 mg tablet TAKE 1 TABLET BY MOUTH ONCE DAILY IN THE EVENING FOR 90 DAYS    omeprazole (PRILOSEC) 40 MG capsule Take 40 mg by mouth once daily.    OXcarbazepine (TRILEPTAL) 300 MG Tab Take 150 mg by mouth 2 (two) times daily.       Review of patient's allergies indicates:  No Known Allergies    History reviewed. No pertinent past medical history.  Past Surgical History:   Procedure Laterality Date    HYSTERECTOMY      OOPHORECTOMY       Family History    None       Tobacco Use    Smoking status: Every Day     Types: Vaping with nicotine    Smokeless tobacco: Never   Substance and Sexual Activity    Alcohol use: Never    Drug use: Never    Sexual activity: Not Currently     Review of Systems   Gastrointestinal:  Positive for abdominal pain, diarrhea, nausea and vomiting.   Objective:     Vital Signs (Most Recent):  Temp: 98.3 °F (36.8 °C) (10/26/22 0715)  Pulse: 70 (10/26/22 0715)  Resp: 18 (10/26/22 0715)  BP: (!) 104/52 (10/26/22 0715)  SpO2: 95 % (10/26/22 0715)   Vital Signs (24h Range):  Temp:  [98 °F (36.7 °C)-98.4 °F (36.9 °C)] 98.3 °F (36.8 °C)  Pulse:  [70-88] 70  Resp:  [18-20] 18  SpO2:  [95  %-99 %] 95 %  BP: ()/(40-58) 104/52     Weight: 86.1 kg (189 lb 13.1 oz)  Body mass index is 31.59 kg/m².    Physical Exam  Vitals and nursing note reviewed.   HENT:      Head: Normocephalic.      Mouth/Throat:      Mouth: Mucous membranes are moist.   Cardiovascular:      Rate and Rhythm: Normal rate.   Pulmonary:      Effort: Pulmonary effort is normal.   Abdominal:      Palpations: Abdomen is soft.      Tenderness: There is abdominal tenderness. There is no guarding or rebound.   Skin:     General: Skin is warm and dry.      Capillary Refill: Capillary refill takes less than 2 seconds.   Neurological:      Mental Status: She is alert and oriented to person, place, and time.   Psychiatric:         Mood and Affect: Mood normal.       Significant Labs:  I have reviewed all pertinent lab results within the past 24 hours.  CBC:   Recent Labs   Lab 10/26/22  0811   WBC 4.17*   RBC 4.51   HGB 12.9   HCT 38.7      MCV 85.8   MCH 28.6   MCHC 33.3     BMP:   Recent Labs   Lab 10/26/22  0554   GLU 82   *   K 3.3*   CL 96*   CO2 23   BUN 64*   CREATININE 4.52*   CALCIUM 8.3*   MG 1.7     CMP:   Recent Labs   Lab 10/25/22  1055 10/26/22  0554   * 82   CALCIUM 9.1 8.3*   ALBUMIN 3.7  --    PROT 9.1*  --    * 134*   K 3.7 3.3*   CO2 22 23   CL 90* 96*   BUN 63* 64*   CREATININE 7.15* 4.52*   ALKPHOS 131*  --    ALT 31  --    AST 22  --    BILITOT 0.3  --      LFTs:   Recent Labs   Lab 10/25/22  1055   ALT 31   AST 22   ALKPHOS 131*   BILITOT 0.3   PROT 9.1*   ALBUMIN 3.7     Coagulation: No results for input(s): LABPROT, INR, APTT in the last 168 hours.    Significant Diagnostics:  I have reviewed all pertinent imaging results/findings within the past 24 hours.

## 2022-10-26 NOTE — HOSPITAL COURSE
10/26- work up thus far indicates pre-renal etiology, will continue with IVF  10/27-renal function continues to improve. Continue IVF, OR today.  10/28- stable for discharge from surgical perspective. Will hold for renal failure one more day to see where Cr lands  10/29-renal function continues to improve. Stable for discharge today. Encouraged fluid intake at home. Needs to follow up with PCP in 1 week. No more NSAIDS-discussed with patient

## 2022-10-26 NOTE — PROGRESS NOTES
"[facility] - [address] [place] [address] Medicine  Progress Note    Patient Name: Tamera Hernandez  MRN: [id number]  Patient Class: IP- Inpatient   Admission Date: 10/25/2022  Length of Stay: 1 days  Attending Physician: Jt Marroquin DO  Primary Care Provider: KATHY Juarez        Subjective:     Principal Problem:Acute renal failure        HPI:  62yof with pmh of hypothyroid, htn, depression, nicotine use who presents from surgery clinic for suspected cholecystitis.  Found to be in acute renal failure after lab evaluation.  She reports 5 days of intermittent vomiting and profuse watery diarrhea >6 times daily.  She is having some abdominal pain as well. Denies any fever chills, nothing like this has happened before.  She has no LUTS but has noticed a decrease in her UOP over the last 2 days. She feels thirsty and dehydrated but has had poor oral intake due to her illness. She does take meloxicam and has on a daily basis for "as long as I can remember".  Omeprazole as well. Bladder scan in ED revealed less than 100cc urine.  No dysuria, hematuria. Denies fever, chills, chest pain, palpitations, SOB. Per patient, BP well controlled at home.  ROS otherwise negative.       Overview/Hospital Course:  10/26- work up thus far indicates pre-renal etiology, will continue with IVF      Interval History: Feels "much better"    Review of Systems   Constitutional:  Negative for chills, fatigue, fever and unexpected weight change.   HENT:  Negative for congestion, mouth sores and sore throat.    Eyes:  Negative for photophobia and visual disturbance.   Respiratory:  Negative for cough, chest tightness, shortness of breath and wheezing.    Cardiovascular:  Negative for chest pain, palpitations and leg swelling.   Gastrointestinal:  Positive for abdominal pain, diarrhea and vomiting. Negative for blood in stool and nausea.   Endocrine: Negative for cold intolerance and heat intolerance.   Genitourinary:  " Positive for decreased urine volume. Negative for difficulty urinating, dysuria, frequency, hematuria and urgency.   Musculoskeletal:  Negative for arthralgias, back pain and myalgias.   Skin:  Negative for pallor and rash.   Neurological:  Negative for tremors, seizures, syncope, weakness, numbness and headaches.   Hematological:  Does not bruise/bleed easily.   Psychiatric/Behavioral:  Negative for agitation, confusion, hallucinations and suicidal ideas.    Objective:     Vital Signs (Most Recent):  Temp: 98.3 °F (36.8 °C) (10/26/22 0715)  Pulse: 70 (10/26/22 0715)  Resp: 18 (10/26/22 0715)  BP: (!) 104/52 (10/26/22 0715)  SpO2: 95 % (10/26/22 0715)   Vital Signs (24h Range):  Temp:  [97.7 °F (36.5 °C)-98.4 °F (36.9 °C)] 98.3 °F (36.8 °C)  Pulse:  [70-88] 70  Resp:  [17-20] 18  SpO2:  [95 %-99 %] 95 %  BP: ()/(40-69) 104/52     Weight: 86.1 kg (189 lb 13.1 oz)  Body mass index is 31.59 kg/m².  No intake or output data in the 24 hours ending 10/26/22 0926   Physical Exam  Vitals reviewed.   Constitutional:       Appearance: Normal appearance.   HENT:      Head: Normocephalic and atraumatic.      Nose: Nose normal.   Eyes:      Extraocular Movements: Extraocular movements intact.      Conjunctiva/sclera: Conjunctivae normal.   Neck:      Trachea: Trachea normal.   Cardiovascular:      Rate and Rhythm: Normal rate and regular rhythm.      Pulses: Normal pulses.      Heart sounds: Normal heart sounds.   Pulmonary:      Effort: Pulmonary effort is normal.      Breath sounds: Normal breath sounds and air entry.   Abdominal:      General: Bowel sounds are normal.      Palpations: Abdomen is soft.      Tenderness: There is abdominal tenderness (to deep palpation).   Musculoskeletal:         General: Normal range of motion.      Cervical back: Neck supple.      Comments: Normal tone, moves all extremities   Skin:     General: Skin is warm and dry.      Capillary Refill: Capillary refill takes less than 2 seconds.    Neurological:      General: No focal deficit present.      Mental Status: She is alert and oriented to person, place, and time.      Cranial Nerves: Cranial nerves 2-12 are intact.      Comments: Grossly normal motor and sensory function without focal deficit appreciated.   Psychiatric:         Mood and Affect: Mood and affect normal.         Behavior: Behavior normal. Behavior is cooperative.         Thought Content: Thought content normal.       Significant Labs: All pertinent labs within the past 24 hours have been reviewed.    Significant Imaging: I have reviewed all pertinent imaging results/findings within the past 24 hours.      Assessment/Plan:      * Acute renal failure  2/2 to dehydration most likely but chronic use of meloxicam confounding. Doubling of creatinine over 24 hrs makes this less likely.  Cr normal 4/2021.  FENA- pre renal  Bladder scan without evidence of retention  UA small blood- no microscopy, small protein  Continue IVF        Nausea & vomiting  zofran prn        Diarrhea  cdiff not collected      Obesity (BMI 30-39.9)  Body mass index is 31.59 kg/m². Morbid obesity complicates all aspects of disease management from diagnostic modalities to treatment. Weight loss encouraged and health benefits explained to patient.         Hyponatremia  Mild, trend      Cholelithiasis with acute cholecystitis  OR once medically optimized        VTE Risk Mitigation (From admission, onward)         Ordered     heparin (porcine) injection 5,000 Units  Every 8 hours         10/25/22 1154     IP VTE HIGH RISK PATIENT  Once         10/25/22 1154     Place sequential compression device  Until discontinued         10/25/22 1154                Discharge Planning   FATOU:      Code Status: Full Code   Is the patient medically ready for discharge?:     Reason for patient still in hospital (select all that apply): Treatment                     Jt Marroquin DO  Department of Hospital Medicine   [facility] -  [address] [facility]

## 2022-10-26 NOTE — PLAN OF CARE
Patient is calm, cooperative, alert and oriented x3. Ambulated to restroom independently. Also had bowel movement.

## 2022-10-26 NOTE — HPI
Several days of diarrhea   N/V  Right side colicky pain not associated with eating  Seen yesterday in [facility] for cholelithiasis on US  Sent to ER due to symptoms of dehydration   Previous hysterectomy

## 2022-10-26 NOTE — SUBJECTIVE & OBJECTIVE
"Interval History: Feels "much better"    Review of Systems   Constitutional:  Negative for chills, fatigue, fever and unexpected weight change.   HENT:  Negative for congestion, mouth sores and sore throat.    Eyes:  Negative for photophobia and visual disturbance.   Respiratory:  Negative for cough, chest tightness, shortness of breath and wheezing.    Cardiovascular:  Negative for chest pain, palpitations and leg swelling.   Gastrointestinal:  Positive for abdominal pain, diarrhea and vomiting. Negative for blood in stool and nausea.   Endocrine: Negative for cold intolerance and heat intolerance.   Genitourinary:  Positive for decreased urine volume. Negative for difficulty urinating, dysuria, frequency, hematuria and urgency.   Musculoskeletal:  Negative for arthralgias, back pain and myalgias.   Skin:  Negative for pallor and rash.   Neurological:  Negative for tremors, seizures, syncope, weakness, numbness and headaches.   Hematological:  Does not bruise/bleed easily.   Psychiatric/Behavioral:  Negative for agitation, confusion, hallucinations and suicidal ideas.    Objective:     Vital Signs (Most Recent):  Temp: 98.3 °F (36.8 °C) (10/26/22 0715)  Pulse: 70 (10/26/22 0715)  Resp: 18 (10/26/22 0715)  BP: (!) 104/52 (10/26/22 0715)  SpO2: 95 % (10/26/22 0715)   Vital Signs (24h Range):  Temp:  [97.7 °F (36.5 °C)-98.4 °F (36.9 °C)] 98.3 °F (36.8 °C)  Pulse:  [70-88] 70  Resp:  [17-20] 18  SpO2:  [95 %-99 %] 95 %  BP: ()/(40-69) 104/52     Weight: 86.1 kg (189 lb 13.1 oz)  Body mass index is 31.59 kg/m².  No intake or output data in the 24 hours ending 10/26/22 0926   Physical Exam  Vitals reviewed.   Constitutional:       Appearance: Normal appearance.   HENT:      Head: Normocephalic and atraumatic.      Nose: Nose normal.   Eyes:      Extraocular Movements: Extraocular movements intact.      Conjunctiva/sclera: Conjunctivae normal.   Neck:      Trachea: Trachea normal.   Cardiovascular:      Rate and " Rhythm: Normal rate and regular rhythm.      Pulses: Normal pulses.      Heart sounds: Normal heart sounds.   Pulmonary:      Effort: Pulmonary effort is normal.      Breath sounds: Normal breath sounds and air entry.   Abdominal:      General: Bowel sounds are normal.      Palpations: Abdomen is soft.      Tenderness: There is abdominal tenderness (to deep palpation).   Musculoskeletal:         General: Normal range of motion.      Cervical back: Neck supple.      Comments: Normal tone, moves all extremities   Skin:     General: Skin is warm and dry.      Capillary Refill: Capillary refill takes less than 2 seconds.   Neurological:      General: No focal deficit present.      Mental Status: She is alert and oriented to person, place, and time.      Cranial Nerves: Cranial nerves 2-12 are intact.      Comments: Grossly normal motor and sensory function without focal deficit appreciated.   Psychiatric:         Mood and Affect: Mood and affect normal.         Behavior: Behavior normal. Behavior is cooperative.         Thought Content: Thought content normal.       Significant Labs: All pertinent labs within the past 24 hours have been reviewed.    Significant Imaging: I have reviewed all pertinent imaging results/findings within the past 24 hours.

## 2022-10-26 NOTE — ASSESSMENT & PLAN NOTE
Body mass index is 31.59 kg/m². Morbid obesity complicates all aspects of disease management from diagnostic modalities to treatment. Weight loss encouraged and health benefits explained to patient.

## 2022-10-26 NOTE — CONSULTS
[facility] Telemetry  General Surgery  Consult Note    Patient Name: Tamera Hernandez  MRN: [id number]  Code Status: Full Code  Admission Date: 10/25/2022  Hospital Length of Stay: 1 days  Attending Physician: Jt Marroquin DO  Primary Care Provider: KATHY Juarez    Patient information was obtained from ER records.     Inpatient consult to General Surgery  Consult performed by: LANA Chen  Consult ordered by: Jt Marroquin DO  Reason for consult: cholelithaisis  Assessment/Recommendations: Lap daniel tomorrow dr herrera if medically stable        Subjective:     Principal Problem: Acute renal failure    History of Present Illness: Several days of diarrhea   N/V  Right side colicky pain not associated with eating  Seen yesterday in dr herrera [facility] for cholelithiasis on US  Sent to ER due to symptoms of dehydration   Previous hysterectomy      No current facility-administered medications on file prior to encounter.     Current Outpatient Medications on File Prior to Encounter   Medication Sig    aspirin (ECOTRIN) 81 MG EC tablet Take 81 mg by mouth once daily.    atorvastatin (LIPITOR) 20 MG tablet Take 20 mg by mouth once daily.    citalopram (CELEXA) 40 MG tablet     dextroamphetamine-amphetamine (ADDERALL XR) 25 MG 24 hr capsule Take by mouth every morning.    estradioL (ESTRACE) 1 MG tablet Take 1 mg by mouth once daily.    EUTHYROX 150 mcg tablet     famotidine (PEPCID) 20 MG tablet Take 20 mg by mouth 2 (two) times daily.    ipratropium (ATROVENT) 42 mcg (0.06 %) nasal spray USE 2 SPRAY(S) IN EACH NOSTRIL 4 TIMES DAILY    losartan-hydrochlorothiazide 100-25 mg (HYZAAR) 100-25 mg per tablet     meloxicam (MOBIC) 15 MG tablet     montelukast (SINGULAIR) 10 mg tablet TAKE 1 TABLET BY MOUTH ONCE DAILY IN THE EVENING FOR 90 DAYS    omeprazole (PRILOSEC) 40 MG capsule Take 40 mg by mouth once daily.    OXcarbazepine (TRILEPTAL) 300 MG Tab Take 150 mg by  mouth 2 (two) times daily.       Review of patient's allergies indicates:  No Known Allergies    History reviewed. No pertinent past medical history.  Past Surgical History:   Procedure Laterality Date    HYSTERECTOMY      OOPHORECTOMY       Family History    None       Tobacco Use    Smoking status: Every Day     Types: Vaping with nicotine    Smokeless tobacco: Never   Substance and Sexual Activity    Alcohol use: Never    Drug use: Never    Sexual activity: Not Currently     Review of Systems   Gastrointestinal:  Positive for abdominal pain, diarrhea, nausea and vomiting.   Objective:     Vital Signs (Most Recent):  Temp: 98.3 °F (36.8 °C) (10/26/22 0715)  Pulse: 70 (10/26/22 0715)  Resp: 18 (10/26/22 0715)  BP: (!) 104/52 (10/26/22 0715)  SpO2: 95 % (10/26/22 0715)   Vital Signs (24h Range):  Temp:  [98 °F (36.7 °C)-98.4 °F (36.9 °C)] 98.3 °F (36.8 °C)  Pulse:  [70-88] 70  Resp:  [18-20] 18  SpO2:  [95 %-99 %] 95 %  BP: ()/(40-58) 104/52     Weight: 86.1 kg (189 lb 13.1 oz)  Body mass index is 31.59 kg/m².    Physical Exam  Vitals and nursing note reviewed.   HENT:      Head: Normocephalic.      Mouth/Throat:      Mouth: Mucous membranes are moist.   Cardiovascular:      Rate and Rhythm: Normal rate.   Pulmonary:      Effort: Pulmonary effort is normal.   Abdominal:      Palpations: Abdomen is soft.      Tenderness: There is abdominal tenderness. There is no guarding or rebound.   Skin:     General: Skin is warm and dry.      Capillary Refill: Capillary refill takes less than 2 seconds.   Neurological:      Mental Status: She is alert and oriented to person, place, and time.   Psychiatric:         Mood and Affect: Mood normal.       Significant Labs:  I have reviewed all pertinent lab results within the past 24 hours.  CBC:   Recent Labs   Lab 10/26/22  0811   WBC 4.17*   RBC 4.51   HGB 12.9   HCT 38.7      MCV 85.8   MCH 28.6   MCHC 33.3     BMP:   Recent Labs   Lab 10/26/22  0554   GLU 82    *   K 3.3*   CL 96*   CO2 23   BUN 64*   CREATININE 4.52*   CALCIUM 8.3*   MG 1.7     CMP:   Recent Labs   Lab 10/25/22  1055 10/26/22  0554   * 82   CALCIUM 9.1 8.3*   ALBUMIN 3.7  --    PROT 9.1*  --    * 134*   K 3.7 3.3*   CO2 22 23   CL 90* 96*   BUN 63* 64*   CREATININE 7.15* 4.52*   ALKPHOS 131*  --    ALT 31  --    AST 22  --    BILITOT 0.3  --      LFTs:   Recent Labs   Lab 10/25/22  1055   ALT 31   AST 22   ALKPHOS 131*   BILITOT 0.3   PROT 9.1*   ALBUMIN 3.7     Coagulation: No results for input(s): LABPROT, INR, APTT in the last 168 hours.    Significant Diagnostics:  I have reviewed all pertinent imaging results/findings within the past 24 hours.      Assessment/Plan:     * Acute renal failure  10/26 improved from 7 to 4 with hydration    Diarrhea  Stool studies    Cholelithiasis with acute cholecystitis  10/26 lap cholecystectomy dr herrera tomorrow explained risks benefits  Npo after mn      VTE Risk Mitigation (From admission, onward)         Ordered     heparin (porcine) injection 5,000 Units  Every 8 hours         10/25/22 1154     IP VTE HIGH RISK PATIENT  Once         10/25/22 1154     Place sequential compression device  Until discontinued         10/25/22 1154                Thank you for your consult. I will follow-up with patient. Please contact us if you have any additional questions.    Nupur Walden, ACNP  General Surgery  [facility] - [facility]

## 2022-10-27 ENCOUNTER — ANESTHESIA (OUTPATIENT)
Dept: SURGERY | Facility: HOSPITAL | Age: 62
DRG: 418 | End: 2022-10-27
Payer: COMMERCIAL

## 2022-10-27 ENCOUNTER — ANESTHESIA EVENT (OUTPATIENT)
Dept: SURGERY | Facility: HOSPITAL | Age: 62
DRG: 418 | End: 2022-10-27
Payer: COMMERCIAL

## 2022-10-27 LAB
ANION GAP SERPL CALCULATED.3IONS-SCNC: 16 MMOL/L (ref 7–16)
BASOPHILS # BLD AUTO: 0.03 K/UL (ref 0–0.2)
BASOPHILS NFR BLD AUTO: 0.6 % (ref 0–1)
BUN SERPL-MCNC: 54 MG/DL (ref 7–18)
BUN/CREAT SERPL: 18 (ref 6–20)
CALCIUM SERPL-MCNC: 7.8 MG/DL (ref 8.5–10.1)
CHLORIDE SERPL-SCNC: 101 MMOL/L (ref 98–107)
CO2 SERPL-SCNC: 18 MMOL/L (ref 21–32)
CREAT SERPL-MCNC: 3 MG/DL (ref 0.55–1.02)
DIFFERENTIAL METHOD BLD: ABNORMAL
EGFR (NO RACE VARIABLE) (RUSH/TITUS): 17 ML/MIN/1.73M²
EOSINOPHIL # BLD AUTO: 0.09 K/UL (ref 0–0.5)
EOSINOPHIL NFR BLD AUTO: 1.9 % (ref 1–4)
ERYTHROCYTE [DISTWIDTH] IN BLOOD BY AUTOMATED COUNT: 12.4 % (ref 11.5–14.5)
FERRITIN SERPL-MCNC: 298 NG/ML (ref 8–252)
GLUCOSE SERPL-MCNC: 92 MG/DL (ref 74–106)
HCT VFR BLD AUTO: 35.5 % (ref 38–47)
HGB BLD-MCNC: 11.5 G/DL (ref 12–16)
IMM GRANULOCYTES # BLD AUTO: 0.06 K/UL (ref 0–0.04)
IMM GRANULOCYTES NFR BLD: 1.3 % (ref 0–0.4)
IRON SATN MFR SERPL: 20 % (ref 14–50)
IRON SERPL-MCNC: 43 ΜG/DL (ref 50–170)
LYMPHOCYTES # BLD AUTO: 1.25 K/UL (ref 1–4.8)
LYMPHOCYTES NFR BLD AUTO: 26.1 % (ref 27–41)
MAGNESIUM SERPL-MCNC: 1.9 MG/DL (ref 1.7–2.3)
MCH RBC QN AUTO: 28.5 PG (ref 27–31)
MCHC RBC AUTO-ENTMCNC: 32.4 G/DL (ref 32–36)
MCV RBC AUTO: 87.9 FL (ref 80–96)
MONOCYTES # BLD AUTO: 0.65 K/UL (ref 0–0.8)
MONOCYTES NFR BLD AUTO: 13.6 % (ref 2–6)
MPC BLD CALC-MCNC: 9.4 FL (ref 9.4–12.4)
NEUTROPHILS # BLD AUTO: 2.71 K/UL (ref 1.8–7.7)
NEUTROPHILS NFR BLD AUTO: 56.5 % (ref 53–65)
NRBC # BLD AUTO: 0 X10E3/UL
NRBC, AUTO (.00): 0 %
PHOSPHATE SERPL-MCNC: 3.4 MG/DL (ref 2.5–4.5)
PLATELET # BLD AUTO: 229 K/UL (ref 150–400)
POTASSIUM SERPL-SCNC: 3 MMOL/L (ref 3.5–5.1)
RBC # BLD AUTO: 4.04 M/UL (ref 4.2–5.4)
SODIUM SERPL-SCNC: 132 MMOL/L (ref 136–145)
TIBC SERPL-MCNC: 219 ΜG/DL (ref 250–450)
WBC # BLD AUTO: 4.79 K/UL (ref 4.5–11)

## 2022-10-27 PROCEDURE — 27201423 OPTIME MED/SURG SUP & DEVICES STERILE SUPPLY: Performed by: STUDENT IN AN ORGANIZED HEALTH CARE EDUCATION/TRAINING PROGRAM

## 2022-10-27 PROCEDURE — 36000708 HC OR TIME LEV III 1ST 15 MIN: Performed by: STUDENT IN AN ORGANIZED HEALTH CARE EDUCATION/TRAINING PROGRAM

## 2022-10-27 PROCEDURE — 37000008 HC ANESTHESIA 1ST 15 MINUTES: Performed by: STUDENT IN AN ORGANIZED HEALTH CARE EDUCATION/TRAINING PROGRAM

## 2022-10-27 PROCEDURE — 88304 TISSUE EXAM BY PATHOLOGIST: CPT | Mod: SUR | Performed by: STUDENT IN AN ORGANIZED HEALTH CARE EDUCATION/TRAINING PROGRAM

## 2022-10-27 PROCEDURE — 99232 SBSQ HOSP IP/OBS MODERATE 35: CPT | Mod: ,,, | Performed by: STUDENT IN AN ORGANIZED HEALTH CARE EDUCATION/TRAINING PROGRAM

## 2022-10-27 PROCEDURE — 25000003 PHARM REV CODE 250: Performed by: STUDENT IN AN ORGANIZED HEALTH CARE EDUCATION/TRAINING PROGRAM

## 2022-10-27 PROCEDURE — 11000001 HC ACUTE MED/SURG PRIVATE ROOM

## 2022-10-27 PROCEDURE — 84100 ASSAY OF PHOSPHORUS: CPT | Performed by: STUDENT IN AN ORGANIZED HEALTH CARE EDUCATION/TRAINING PROGRAM

## 2022-10-27 PROCEDURE — 27000689 HC BLADE LARYNGOSCOPE ANY SIZE: Performed by: NURSE ANESTHETIST, CERTIFIED REGISTERED

## 2022-10-27 PROCEDURE — 63600175 PHARM REV CODE 636 W HCPCS: Performed by: STUDENT IN AN ORGANIZED HEALTH CARE EDUCATION/TRAINING PROGRAM

## 2022-10-27 PROCEDURE — 82728 ASSAY OF FERRITIN: CPT | Performed by: STUDENT IN AN ORGANIZED HEALTH CARE EDUCATION/TRAINING PROGRAM

## 2022-10-27 PROCEDURE — 27000655: Performed by: NURSE ANESTHETIST, CERTIFIED REGISTERED

## 2022-10-27 PROCEDURE — D9220A PRA ANESTHESIA: Mod: ANES,,, | Performed by: ANESTHESIOLOGY

## 2022-10-27 PROCEDURE — 85025 COMPLETE CBC W/AUTO DIFF WBC: CPT | Performed by: STUDENT IN AN ORGANIZED HEALTH CARE EDUCATION/TRAINING PROGRAM

## 2022-10-27 PROCEDURE — 88304 TISSUE EXAM BY PATHOLOGIST: CPT | Mod: 26,,, | Performed by: PATHOLOGY

## 2022-10-27 PROCEDURE — 27000165 HC TUBE, ETT CUFFED: Performed by: NURSE ANESTHETIST, CERTIFIED REGISTERED

## 2022-10-27 PROCEDURE — 80048 BASIC METABOLIC PNL TOTAL CA: CPT | Performed by: STUDENT IN AN ORGANIZED HEALTH CARE EDUCATION/TRAINING PROGRAM

## 2022-10-27 PROCEDURE — D9220A PRA ANESTHESIA: Mod: CRNA,,, | Performed by: NURSE ANESTHETIST, CERTIFIED REGISTERED

## 2022-10-27 PROCEDURE — D9220A PRA ANESTHESIA: ICD-10-PCS | Mod: CRNA,,, | Performed by: NURSE ANESTHETIST, CERTIFIED REGISTERED

## 2022-10-27 PROCEDURE — 27000716 HC OXISENSOR PROBE, ANY SIZE: Performed by: NURSE ANESTHETIST, CERTIFIED REGISTERED

## 2022-10-27 PROCEDURE — 25000003 PHARM REV CODE 250: Performed by: INTERNAL MEDICINE

## 2022-10-27 PROCEDURE — 71000033 HC RECOVERY, INTIAL HOUR: Performed by: STUDENT IN AN ORGANIZED HEALTH CARE EDUCATION/TRAINING PROGRAM

## 2022-10-27 PROCEDURE — 36000709 HC OR TIME LEV III EA ADD 15 MIN: Performed by: STUDENT IN AN ORGANIZED HEALTH CARE EDUCATION/TRAINING PROGRAM

## 2022-10-27 PROCEDURE — 63600175 PHARM REV CODE 636 W HCPCS: Performed by: NURSE ANESTHETIST, CERTIFIED REGISTERED

## 2022-10-27 PROCEDURE — 83540 ASSAY OF IRON: CPT | Performed by: STUDENT IN AN ORGANIZED HEALTH CARE EDUCATION/TRAINING PROGRAM

## 2022-10-27 PROCEDURE — D9220A PRA ANESTHESIA: ICD-10-PCS | Mod: ANES,,, | Performed by: ANESTHESIOLOGY

## 2022-10-27 PROCEDURE — 37000009 HC ANESTHESIA EA ADD 15 MINS: Performed by: STUDENT IN AN ORGANIZED HEALTH CARE EDUCATION/TRAINING PROGRAM

## 2022-10-27 PROCEDURE — 25000003 PHARM REV CODE 250: Performed by: NURSE ANESTHETIST, CERTIFIED REGISTERED

## 2022-10-27 PROCEDURE — 36415 COLL VENOUS BLD VENIPUNCTURE: CPT | Performed by: STUDENT IN AN ORGANIZED HEALTH CARE EDUCATION/TRAINING PROGRAM

## 2022-10-27 PROCEDURE — 88304 SURGICAL PATHOLOGY: ICD-10-PCS | Mod: 26,,, | Performed by: PATHOLOGY

## 2022-10-27 PROCEDURE — 27000510 HC BLANKET BAIR HUGGER ANY SIZE: Performed by: NURSE ANESTHETIST, CERTIFIED REGISTERED

## 2022-10-27 PROCEDURE — 47562 PR LAP,CHOLECYSTECTOMY: ICD-10-PCS | Mod: ,,, | Performed by: STUDENT IN AN ORGANIZED HEALTH CARE EDUCATION/TRAINING PROGRAM

## 2022-10-27 PROCEDURE — 47562 LAPAROSCOPIC CHOLECYSTECTOMY: CPT | Mod: ,,, | Performed by: STUDENT IN AN ORGANIZED HEALTH CARE EDUCATION/TRAINING PROGRAM

## 2022-10-27 PROCEDURE — 83735 ASSAY OF MAGNESIUM: CPT | Performed by: STUDENT IN AN ORGANIZED HEALTH CARE EDUCATION/TRAINING PROGRAM

## 2022-10-27 PROCEDURE — 99232 PR SUBSEQUENT HOSPITAL CARE,LEVL II: ICD-10-PCS | Mod: ,,, | Performed by: STUDENT IN AN ORGANIZED HEALTH CARE EDUCATION/TRAINING PROGRAM

## 2022-10-27 RX ORDER — FENTANYL CITRATE 50 UG/ML
INJECTION, SOLUTION INTRAMUSCULAR; INTRAVENOUS
Status: DISCONTINUED | OUTPATIENT
Start: 2022-10-27 | End: 2022-10-27

## 2022-10-27 RX ORDER — MIDAZOLAM HYDROCHLORIDE 1 MG/ML
INJECTION INTRAMUSCULAR; INTRAVENOUS
Status: DISCONTINUED | OUTPATIENT
Start: 2022-10-27 | End: 2022-10-27

## 2022-10-27 RX ORDER — ROCURONIUM BROMIDE 50 MG/5 ML
SYRINGE (ML) INTRAVENOUS
Status: DISCONTINUED | OUTPATIENT
Start: 2022-10-27 | End: 2022-10-27

## 2022-10-27 RX ORDER — HYDROMORPHONE HYDROCHLORIDE 2 MG/ML
0.5 INJECTION, SOLUTION INTRAMUSCULAR; INTRAVENOUS; SUBCUTANEOUS EVERY 5 MIN PRN
Status: CANCELLED | OUTPATIENT
Start: 2022-10-27

## 2022-10-27 RX ORDER — BUPIVACAINE HYDROCHLORIDE 2.5 MG/ML
INJECTION, SOLUTION EPIDURAL; INFILTRATION; INTRACAUDAL
Status: DISCONTINUED | OUTPATIENT
Start: 2022-10-27 | End: 2022-10-27 | Stop reason: HOSPADM

## 2022-10-27 RX ORDER — ONDANSETRON 2 MG/ML
INJECTION INTRAMUSCULAR; INTRAVENOUS
Status: DISCONTINUED | OUTPATIENT
Start: 2022-10-27 | End: 2022-10-27

## 2022-10-27 RX ORDER — HYDROCODONE BITARTRATE AND ACETAMINOPHEN 5; 325 MG/1; MG/1
1 TABLET ORAL EVERY 6 HOURS PRN
Status: DISCONTINUED | OUTPATIENT
Start: 2022-10-27 | End: 2022-10-29 | Stop reason: HOSPADM

## 2022-10-27 RX ORDER — POTASSIUM CHLORIDE 20 MEQ/1
40 TABLET, EXTENDED RELEASE ORAL EVERY 4 HOURS
Status: DISPENSED | OUTPATIENT
Start: 2022-10-27 | End: 2022-10-28

## 2022-10-27 RX ORDER — CEFAZOLIN SODIUM 1 G/3ML
INJECTION, POWDER, FOR SOLUTION INTRAMUSCULAR; INTRAVENOUS
Status: DISCONTINUED | OUTPATIENT
Start: 2022-10-27 | End: 2022-10-27

## 2022-10-27 RX ORDER — SODIUM CHLORIDE, SODIUM LACTATE, POTASSIUM CHLORIDE, CALCIUM CHLORIDE 600; 310; 30; 20 MG/100ML; MG/100ML; MG/100ML; MG/100ML
INJECTION, SOLUTION INTRAVENOUS CONTINUOUS
Status: DISCONTINUED | OUTPATIENT
Start: 2022-10-27 | End: 2022-10-29 | Stop reason: HOSPADM

## 2022-10-27 RX ORDER — DEXAMETHASONE SODIUM PHOSPHATE 4 MG/ML
INJECTION, SOLUTION INTRA-ARTICULAR; INTRALESIONAL; INTRAMUSCULAR; INTRAVENOUS; SOFT TISSUE
Status: DISCONTINUED | OUTPATIENT
Start: 2022-10-27 | End: 2022-10-27

## 2022-10-27 RX ORDER — DIPHENHYDRAMINE HYDROCHLORIDE 50 MG/ML
25 INJECTION INTRAMUSCULAR; INTRAVENOUS EVERY 6 HOURS PRN
Status: CANCELLED | OUTPATIENT
Start: 2022-10-27

## 2022-10-27 RX ORDER — ONDANSETRON 2 MG/ML
4 INJECTION INTRAMUSCULAR; INTRAVENOUS DAILY PRN
Status: CANCELLED | OUTPATIENT
Start: 2022-10-27

## 2022-10-27 RX ORDER — LIDOCAINE HYDROCHLORIDE AND EPINEPHRINE 10; 10 MG/ML; UG/ML
INJECTION, SOLUTION INFILTRATION; PERINEURAL
Status: DISCONTINUED | OUTPATIENT
Start: 2022-10-27 | End: 2022-10-27 | Stop reason: HOSPADM

## 2022-10-27 RX ORDER — IPRATROPIUM BROMIDE AND ALBUTEROL SULFATE 2.5; .5 MG/3ML; MG/3ML
3 SOLUTION RESPIRATORY (INHALATION) ONCE AS NEEDED
Status: CANCELLED | OUTPATIENT
Start: 2022-10-27 | End: 2034-03-25

## 2022-10-27 RX ORDER — MORPHINE SULFATE 10 MG/ML
4 INJECTION INTRAMUSCULAR; INTRAVENOUS; SUBCUTANEOUS EVERY 5 MIN PRN
Status: CANCELLED | OUTPATIENT
Start: 2022-10-27

## 2022-10-27 RX ORDER — LIDOCAINE HYDROCHLORIDE 20 MG/ML
INJECTION, SOLUTION EPIDURAL; INFILTRATION; INTRACAUDAL; PERINEURAL
Status: DISCONTINUED | OUTPATIENT
Start: 2022-10-27 | End: 2022-10-27

## 2022-10-27 RX ORDER — PROPOFOL 10 MG/ML
VIAL (ML) INTRAVENOUS
Status: DISCONTINUED | OUTPATIENT
Start: 2022-10-27 | End: 2022-10-27

## 2022-10-27 RX ORDER — MEPERIDINE HYDROCHLORIDE 25 MG/ML
25 INJECTION INTRAMUSCULAR; INTRAVENOUS; SUBCUTANEOUS ONCE AS NEEDED
Status: CANCELLED | OUTPATIENT
Start: 2022-10-27 | End: 2022-10-28

## 2022-10-27 RX ORDER — PHENYLEPHRINE HYDROCHLORIDE 10 MG/ML
INJECTION INTRAVENOUS
Status: DISCONTINUED | OUTPATIENT
Start: 2022-10-27 | End: 2022-10-27

## 2022-10-27 RX ADMIN — PHENYLEPHRINE HYDROCHLORIDE 100 MCG: 10 INJECTION INTRAVENOUS at 02:10

## 2022-10-27 RX ADMIN — PANTOPRAZOLE SODIUM 40 MG: 40 TABLET, DELAYED RELEASE ORAL at 09:10

## 2022-10-27 RX ADMIN — PROPOFOL 160 MG: 10 INJECTION, EMULSION INTRAVENOUS at 01:10

## 2022-10-27 RX ADMIN — POTASSIUM CHLORIDE 40 MEQ: 1500 TABLET, EXTENDED RELEASE ORAL at 06:10

## 2022-10-27 RX ADMIN — CITALOPRAM HYDROBROMIDE 40 MG: 20 TABLET ORAL at 09:10

## 2022-10-27 RX ADMIN — ATORVASTATIN CALCIUM 20 MG: 20 TABLET, FILM COATED ORAL at 09:10

## 2022-10-27 RX ADMIN — Medication 50 MG: at 01:10

## 2022-10-27 RX ADMIN — MIDAZOLAM HYDROCHLORIDE 2 MG: 1 INJECTION, SOLUTION INTRAMUSCULAR; INTRAVENOUS at 01:10

## 2022-10-27 RX ADMIN — FENTANYL CITRATE 100 MCG: 50 INJECTION INTRAMUSCULAR; INTRAVENOUS at 01:10

## 2022-10-27 RX ADMIN — DEXAMETHASONE SODIUM PHOSPHATE 4 MG: 4 INJECTION, SOLUTION INTRA-ARTICULAR; INTRALESIONAL; INTRAMUSCULAR; INTRAVENOUS; SOFT TISSUE at 02:10

## 2022-10-27 RX ADMIN — HYDROCODONE BITARTRATE AND ACETAMINOPHEN 1 TABLET: 5; 325 TABLET ORAL at 08:10

## 2022-10-27 RX ADMIN — OXCARBAZEPINE 150 MG: 150 TABLET, FILM COATED ORAL at 08:10

## 2022-10-27 RX ADMIN — LEVOTHYROXINE SODIUM 150 MCG: 0.15 TABLET ORAL at 05:10

## 2022-10-27 RX ADMIN — LIDOCAINE HYDROCHLORIDE 60 MG: 20 INJECTION, SOLUTION EPIDURAL; INFILTRATION; INTRACAUDAL; PERINEURAL at 01:10

## 2022-10-27 RX ADMIN — PHENYLEPHRINE HYDROCHLORIDE 100 MCG: 10 INJECTION INTRAVENOUS at 01:10

## 2022-10-27 RX ADMIN — HEPARIN SODIUM 5000 UNITS: 5000 INJECTION INTRAVENOUS; SUBCUTANEOUS at 02:10

## 2022-10-27 RX ADMIN — OXCARBAZEPINE 150 MG: 150 TABLET, FILM COATED ORAL at 09:10

## 2022-10-27 RX ADMIN — MONTELUKAST SODIUM 10 MG: 10 TABLET, FILM COATED ORAL at 09:10

## 2022-10-27 RX ADMIN — ONDANSETRON 4 MG: 2 INJECTION INTRAMUSCULAR; INTRAVENOUS at 01:10

## 2022-10-27 RX ADMIN — ACETAMINOPHEN 1000 MG: 500 TABLET ORAL at 07:10

## 2022-10-27 RX ADMIN — SODIUM CHLORIDE, POTASSIUM CHLORIDE, SODIUM LACTATE AND CALCIUM CHLORIDE: 600; 310; 30; 20 INJECTION, SOLUTION INTRAVENOUS at 08:10

## 2022-10-27 RX ADMIN — SODIUM CHLORIDE, POTASSIUM CHLORIDE, SODIUM LACTATE AND CALCIUM CHLORIDE: 600; 310; 30; 20 INJECTION, SOLUTION INTRAVENOUS at 01:10

## 2022-10-27 RX ADMIN — POTASSIUM CHLORIDE 40 MEQ: 1500 TABLET, EXTENDED RELEASE ORAL at 09:10

## 2022-10-27 RX ADMIN — CEFAZOLIN 2 G: 1 INJECTION, POWDER, FOR SOLUTION INTRAMUSCULAR; INTRAVENOUS; PARENTERAL at 01:10

## 2022-10-27 NOTE — ANESTHESIA PREPROCEDURE EVALUATION
10/27/2022  Tamera Hernandez is a 62 y.o., female.      Pre-op Assessment    I have reviewed the Patient Summary Reports.     I have reviewed the Nursing Notes. I have reviewed the NPO Status.   I have reviewed the Medications.     Review of Systems  Anesthesia Hx:  No problems with previous Anesthesia  Denies Family Hx of Anesthesia complications.   Denies Personal Hx of Anesthesia complications.   Social:  Smoker, No Alcohol Use    Cardiovascular:   Exercise tolerance: poor Hypertension hyperlipidemia    Pulmonary:   COPD Shortness of breath    Renal/:   Chronic Renal Disease, ARF    Hepatic/GI:   GERD Acute cholecystitis   Endocrine:  Obesity / BMI > 30  Psych:   Psychiatric History          Physical Exam  General: Well nourished, Cooperative and Alert    Airway:  Mallampati: II   Mouth Opening: Normal  TM Distance: Normal  Tongue: Normal  Neck ROM: Normal ROM    Chest/Lungs:  Clear to auscultation, Normal Respiratory Rate    Heart:  Rate: Normal  Rhythm: Regular Rhythm        Chemistry        Component Value Date/Time     (L) 10/27/2022 0335    K 3.0 (L) 10/27/2022 0335     10/27/2022 0335    CO2 18 (L) 10/27/2022 0335    BUN 54 (H) 10/27/2022 0335    CREATININE 3.00 (H) 10/27/2022 0335    GLU 92 10/27/2022 0335        Component Value Date/Time    CALCIUM 7.8 (L) 10/27/2022 0335    ALKPHOS 131 (H) 10/25/2022 1055    AST 22 10/25/2022 1055    ALT 31 10/25/2022 1055    BILITOT 0.3 10/25/2022 1055        Lab Results   Component Value Date    WBC 4.79 10/27/2022    RBC 4.04 (L) 10/27/2022    HGB 11.5 (L) 10/27/2022    MCV 87.9 10/27/2022    MCH 28.5 10/27/2022    MCHC 32.4 10/27/2022    RDW 12.4 10/27/2022     10/27/2022    MPV 9.4 10/27/2022    LYMPH 26.1 (L) 10/27/2022    LYMPH 1.25 10/27/2022    MONO 13.6 (H) 10/27/2022    EOS 0.09 10/27/2022    BASO 0.03 10/27/2022     Results for  orders placed or performed during the hospital encounter of 10/25/22   EKG 12-lead    Collection Time: 10/25/22 11:21 AM    Narrative    Test Reason : Z01.818,    Vent. Rate : 083 BPM     Atrial Rate : 083 BPM     P-R Int : 160 ms          QRS Dur : 086 ms      QT Int : 466 ms       P-R-T Axes : 049 013 020 degrees     QTc Int : 547 ms    Sinus rhythm with Premature atrial complexes  Nonspecific ST abnormality  Prolonged QT  Abnormal ECG  No previous ECGs available    Referred By: [email or web address]   SELF           Confirmed By:          Anesthesia Plan  Type of Anesthesia, risks & benefits discussed:    Anesthesia Type: Gen ETT  Intra-op Monitoring Plan: Standard ASA Monitors  Post Op Pain Control Plan: multimodal analgesia  Induction:  IV  Airway Plan: Direct, Post-Induction  Informed Consent: Informed consent signed with the Patient and all parties understand the risks and agree with anesthesia plan.  All questions answered.   ASA Score: 3 Emergent  Day of Surgery Review of History & Physical: H&P Update referred to the surgeon/provider.I have interviewed and examined the patient. I have reviewed the patient's H&P dated: There are no significant changes.     Ready For Surgery From Anesthesia Perspective.     .

## 2022-10-27 NOTE — ANESTHESIA PROCEDURE NOTES
Intubation    Date/Time: 10/27/2022 1:53 PM  Performed by: Janna Rod CRNA  Authorized by: Leroy Foote MD     Intubation:     Induction:  Intravenous    Intubated:  Postinduction    Mask Ventilation:  Easy mask    Attempts:  1    Attempted By:  CRNA    Method of Intubation:  Direct    Blade:  Monica 4    Laryngeal View Grade: Grade IIA - cords partially seen      Difficult Airway Encountered?: No      Complications:  None    Airway Device:  Oral endotracheal tube    Airway Device Size:  7.0    Style/Cuff Inflation:  Cuffed    Inflation Amount (mL):  7    Tube secured:  21    Placement Verified By:  Capnometry    Complicating Factors:  None    Findings Post-Intubation:  BS equal bilateral and atraumatic/condition of teeth unchanged

## 2022-10-27 NOTE — PROGRESS NOTES
"[facility] - Periop Services Hospital Medicine  Progress Note    Patient Name: Tamera Hernandez  MRN: [id number]  Patient Class: IP- Inpatient   Admission Date: 10/25/2022  Length of Stay: 2 days  Attending Physician: Jt Marroquin DO  Primary Care Provider: KATHY Juarez        Subjective:     Principal Problem:Acute renal failure        HPI:  62yof with pmh of hypothyroid, htn, depression, nicotine use who presents from surgery clinic for suspected cholecystitis.  Found to be in acute renal failure after lab evaluation.  She reports 5 days of intermittent vomiting and profuse watery diarrhea >6 times daily.  She is having some abdominal pain as well. Denies any fever chills, nothing like this has happened before.  She has no LUTS but has noticed a decrease in her UOP over the last 2 days. She feels thirsty and dehydrated but has had poor oral intake due to her illness. She does take meloxicam and has on a daily basis for "as long as I can remember".  Omeprazole as well. Bladder scan in ED revealed less than 100cc urine.  No dysuria, hematuria. Denies fever, chills, chest pain, palpitations, SOB. Per patient, BP well controlled at home.  ROS otherwise negative.       Overview/Hospital Course:  10/26- work up thus far indicates pre-renal etiology, will continue with IVF  10/27-renal function continues to improve. Continue IVF, OR today.      Interval History: Feels "much better"    Review of Systems   Constitutional:  Negative for chills, fatigue, fever and unexpected weight change.   HENT:  Negative for congestion, mouth sores and sore throat.    Eyes:  Negative for photophobia and visual disturbance.   Respiratory:  Negative for cough, chest tightness, shortness of breath and wheezing.    Cardiovascular:  Negative for chest pain, palpitations and leg swelling.   Gastrointestinal:  Positive for abdominal pain, diarrhea and vomiting. Negative for blood in stool and nausea.   Endocrine: Negative for " cold intolerance and heat intolerance.   Genitourinary:  Positive for decreased urine volume. Negative for difficulty urinating, dysuria, frequency, hematuria and urgency.   Musculoskeletal:  Negative for arthralgias, back pain and myalgias.   Skin:  Negative for pallor and rash.   Neurological:  Negative for tremors, seizures, syncope, weakness, numbness and headaches.   Hematological:  Does not bruise/bleed easily.   Psychiatric/Behavioral:  Negative for agitation, confusion, hallucinations and suicidal ideas.    Objective:     Vital Signs (Most Recent):  Temp: 98.6 °F (37 °C) (10/27/22 1100)  Pulse: 63 (10/27/22 1100)  Resp: 18 (10/27/22 1100)  BP: (!) 106/59 (10/27/22 1100)  SpO2: 98 % (10/27/22 1100)   Vital Signs (24h Range):  Temp:  [97.4 °F (36.3 °C)-98.6 °F (37 °C)] 98.6 °F (37 °C)  Pulse:  [60-82] 63  Resp:  [17-20] 18  SpO2:  [98 %-99 %] 98 %  BP: (101-122)/(55-69) 106/59     Weight: 86.1 kg (189 lb 13.1 oz)  Body mass index is 31.59 kg/m².  No intake or output data in the 24 hours ending 10/27/22 1337   Physical Exam  Vitals reviewed.   Constitutional:       Appearance: Normal appearance.   HENT:      Head: Normocephalic and atraumatic.      Nose: Nose normal.   Eyes:      Extraocular Movements: Extraocular movements intact.      Conjunctiva/sclera: Conjunctivae normal.   Neck:      Trachea: Trachea normal.   Cardiovascular:      Rate and Rhythm: Normal rate and regular rhythm.      Pulses: Normal pulses.      Heart sounds: Normal heart sounds.   Pulmonary:      Effort: Pulmonary effort is normal.      Breath sounds: Normal breath sounds and air entry.   Abdominal:      General: Bowel sounds are normal.      Palpations: Abdomen is soft.      Tenderness: There is abdominal tenderness (to deep palpation).   Musculoskeletal:         General: Normal range of motion.      Cervical back: Neck supple.      Comments: Normal tone, moves all extremities   Skin:     General: Skin is warm and dry.      Capillary  Refill: Capillary refill takes less than 2 seconds.   Neurological:      General: No focal deficit present.      Mental Status: She is alert and oriented to person, place, and time.      Cranial Nerves: Cranial nerves 2-12 are intact.      Comments: Grossly normal motor and sensory function without focal deficit appreciated.   Psychiatric:         Mood and Affect: Mood and affect normal.         Behavior: Behavior normal. Behavior is cooperative.         Thought Content: Thought content normal.       Significant Labs: All pertinent labs within the past 24 hours have been reviewed.    Significant Imaging: I have reviewed all pertinent imaging results/findings within the past 24 hours.      Assessment/Plan:      * Acute renal failure  2/2 to dehydration most likely but chronic use of meloxicam confounding. Doubling of creatinine over 24 hrs makes this less likely.  Cr normal 4/2021.  FENA- pre renal  Bladder scan without evidence of retention  UA small blood- no microscopy, small protein  Continue IVF        Nausea & vomiting  zofran prn        Diarrhea  Cdiff pending. Diarrhea since prior to admission        Obesity (BMI 30-39.9)  Body mass index is 31.59 kg/m². Morbid obesity complicates all aspects of disease management from diagnostic modalities to treatment. Weight loss encouraged and health benefits explained to patient.         Hyponatremia  Mild, trend      Cholelithiasis with acute cholecystitis  OR today        VTE Risk Mitigation (From admission, onward)         Ordered     heparin (porcine) injection 5,000 Units  Every 8 hours         10/25/22 1154     IP VTE HIGH RISK PATIENT  Once         10/25/22 1154     Place sequential compression device  Until discontinued         10/25/22 1154                Discharge Planning   FATOU:      Code Status: Full Code   Is the patient medically ready for discharge?:     Reason for patient still in hospital (select all that apply): Katerin Waters  DO Lopez  Department of Hospital Medicine   [facility] - Periop Services

## 2022-10-27 NOTE — TRANSFER OF CARE
"Anesthesia Transfer of Care Note    Patient: Tamera Hernandez    Procedure(s) Performed: Procedure(s) (LRB):  CHOLECYSTECTOMY, LAPAROSCOPIC (N/A)    Patient location: PACU    Anesthesia Type: general    Transport from OR: Transported from OR on 6-10 L/min O2 by face mask with adequate spontaneous ventilation    Post pain: adequate analgesia    Post assessment: no apparent anesthetic complications    Post vital signs: stable    Level of consciousness: sedated    Nausea/Vomiting: no nausea/vomiting    Complications: none    Transfer of care protocol was followed      Last vitals:   Visit Vitals  BP (!) 110/52   Pulse 80   Temp 36.4 °C (97.6 °F)   Resp 16   Ht 5' 5" (1.651 m)   Wt 86.1 kg (189 lb 13.1 oz)   SpO2 97%   Breastfeeding No   BMI 31.59 kg/m²     "

## 2022-10-27 NOTE — OP NOTE
[facility] - Periop Services  Surgery Department  Operative Note    SUMMARY     Date of Procedure: 10/27/2022     Procedure: Procedure(s) (LRB):  CHOLECYSTECTOMY, LAPAROSCOPIC (N/A)     Surgeon(s) and Role:     * Maxime Hernandez DO - Primary    Assisting Surgeon: None    Pre-Operative Diagnosis: Calculus of gallbladder with acute cholecystitis without obstruction [K80.00]    Post-Operative Diagnosis: Post-Op Diagnosis Codes:     * Calculus of gallbladder with acute cholecystitis without obstruction [K80.00]    Anesthesia: General    Operative Findings (including complications, if any): distended gallbladder     Description of Technical Procedures:  The patient was taken the operating room and placed on the operating table in supine position.  The patient underwent general anesthesia per the anesthesia team.  The abdomen was then prepped and draped in usual sterile fashion.  A stab incision was made in left upper quadrant a Veress needle was inserted and the abdomen was insufflated to 15 mmHg; patient tolerated insufflation well.  A 5 mm trocar was placed at the umbilicus under visualization with the laparoscoped we entered the abdomen; no injuries identified.  We then placed an 11 mm trocar at the subxiphoid region, followed by 2 5 mm trocars in the right upper quadrant; all done under visualization.  The gallbladder was then grasped and retracted cephalad.  We dissected out the cystic duct and cystic artery; we found a blood vessel running anteriorly and was very large and there was lot of tissue around the cystic duct.  At this time we elected to take the gallbladder off the liver with a top-down approach.  As we did this we did identify the cystic duct and this other blood vessel being the only 2 structures going into the gallbladder.  At this time we placed 2 clips on both structures and transected.  We removed the gallbladder from the subxiphoid port via an Endo-Catch bag without difficulty.  There  was no bile spillage.  We irrigated the gallbladder fossa and suctioned till clear.  Bleeding controlled electrocautery and 1 piece of Surgicel.  We desufflated the abdomen and trocars were removed.  Skin incisions were approximated with 4-0 Monocryl deep dermal suture.  The skin was cleaned and dried, Mastisol Steri-Strips applied.  The patient was awakened, extubated and taken the PACU in stable condition.  Patient tolerated procedure well.        Estimated Blood Loss (EBL): 10cc           Implants: * No implants in log *    Specimens:   Specimen (24h ago, onward)       Start     Ordered    10/27/22 1444  Surgical Pathology  RELEASE UPON ORDERING         10/27/22 1444                            Condition: Good    Disposition: PACU - hemodynamically stable.    Attestation: I was present and scrubbed for the entire procedure.

## 2022-10-27 NOTE — SUBJECTIVE & OBJECTIVE
"Interval History: Feels "much better"    Review of Systems   Constitutional:  Negative for chills, fatigue, fever and unexpected weight change.   HENT:  Negative for congestion, mouth sores and sore throat.    Eyes:  Negative for photophobia and visual disturbance.   Respiratory:  Negative for cough, chest tightness, shortness of breath and wheezing.    Cardiovascular:  Negative for chest pain, palpitations and leg swelling.   Gastrointestinal:  Positive for abdominal pain, diarrhea and vomiting. Negative for blood in stool and nausea.   Endocrine: Negative for cold intolerance and heat intolerance.   Genitourinary:  Positive for decreased urine volume. Negative for difficulty urinating, dysuria, frequency, hematuria and urgency.   Musculoskeletal:  Negative for arthralgias, back pain and myalgias.   Skin:  Negative for pallor and rash.   Neurological:  Negative for tremors, seizures, syncope, weakness, numbness and headaches.   Hematological:  Does not bruise/bleed easily.   Psychiatric/Behavioral:  Negative for agitation, confusion, hallucinations and suicidal ideas.    Objective:     Vital Signs (Most Recent):  Temp: 98.6 °F (37 °C) (10/27/22 1100)  Pulse: 63 (10/27/22 1100)  Resp: 18 (10/27/22 1100)  BP: (!) 106/59 (10/27/22 1100)  SpO2: 98 % (10/27/22 1100)   Vital Signs (24h Range):  Temp:  [97.4 °F (36.3 °C)-98.6 °F (37 °C)] 98.6 °F (37 °C)  Pulse:  [60-82] 63  Resp:  [17-20] 18  SpO2:  [98 %-99 %] 98 %  BP: (101-122)/(55-69) 106/59     Weight: 86.1 kg (189 lb 13.1 oz)  Body mass index is 31.59 kg/m².  No intake or output data in the 24 hours ending 10/27/22 1337   Physical Exam  Vitals reviewed.   Constitutional:       Appearance: Normal appearance.   HENT:      Head: Normocephalic and atraumatic.      Nose: Nose normal.   Eyes:      Extraocular Movements: Extraocular movements intact.      Conjunctiva/sclera: Conjunctivae normal.   Neck:      Trachea: Trachea normal.   Cardiovascular:      Rate and Rhythm: " Normal rate and regular rhythm.      Pulses: Normal pulses.      Heart sounds: Normal heart sounds.   Pulmonary:      Effort: Pulmonary effort is normal.      Breath sounds: Normal breath sounds and air entry.   Abdominal:      General: Bowel sounds are normal.      Palpations: Abdomen is soft.      Tenderness: There is abdominal tenderness (to deep palpation).   Musculoskeletal:         General: Normal range of motion.      Cervical back: Neck supple.      Comments: Normal tone, moves all extremities   Skin:     General: Skin is warm and dry.      Capillary Refill: Capillary refill takes less than 2 seconds.   Neurological:      General: No focal deficit present.      Mental Status: She is alert and oriented to person, place, and time.      Cranial Nerves: Cranial nerves 2-12 are intact.      Comments: Grossly normal motor and sensory function without focal deficit appreciated.   Psychiatric:         Mood and Affect: Mood and affect normal.         Behavior: Behavior normal. Behavior is cooperative.         Thought Content: Thought content normal.       Significant Labs: All pertinent labs within the past 24 hours have been reviewed.    Significant Imaging: I have reviewed all pertinent imaging results/findings within the past 24 hours.

## 2022-10-28 LAB
ANION GAP SERPL CALCULATED.3IONS-SCNC: 15 MMOL/L (ref 7–16)
BASOPHILS # BLD AUTO: 0.02 K/UL (ref 0–0.2)
BASOPHILS NFR BLD AUTO: 0.4 % (ref 0–1)
BUN SERPL-MCNC: 36 MG/DL (ref 7–18)
BUN/CREAT SERPL: 19 (ref 6–20)
C DIFF TOX A+B STL IA-ACNC: NEGATIVE
CALCIUM SERPL-MCNC: 8.1 MG/DL (ref 8.5–10.1)
CHLORIDE SERPL-SCNC: 107 MMOL/L (ref 98–107)
CO2 SERPL-SCNC: 21 MMOL/L (ref 21–32)
CREAT SERPL-MCNC: 1.93 MG/DL (ref 0.55–1.02)
DIFFERENTIAL METHOD BLD: ABNORMAL
EGFR (NO RACE VARIABLE) (RUSH/TITUS): 29 ML/MIN/1.73M²
EOSINOPHIL # BLD AUTO: 0.02 K/UL (ref 0–0.5)
EOSINOPHIL NFR BLD AUTO: 0.4 % (ref 1–4)
ERYTHROCYTE [DISTWIDTH] IN BLOOD BY AUTOMATED COUNT: 12.6 % (ref 11.5–14.5)
ESTROGEN SERPL-MCNC: NORMAL PG/ML
GLUCOSE SERPL-MCNC: 100 MG/DL (ref 74–106)
HCT VFR BLD AUTO: 32.3 % (ref 38–47)
HGB BLD-MCNC: 10.5 G/DL (ref 12–16)
IMM GRANULOCYTES # BLD AUTO: 0.07 K/UL (ref 0–0.04)
IMM GRANULOCYTES NFR BLD: 1.5 % (ref 0–0.4)
INSULIN SERPL-ACNC: NORMAL U[IU]/ML
LAB AP GROSS DESCRIPTION: NORMAL
LAB AP LABORATORY NOTES: NORMAL
LYMPHOCYTES # BLD AUTO: 1.17 K/UL (ref 1–4.8)
LYMPHOCYTES NFR BLD AUTO: 25.4 % (ref 27–41)
MAGNESIUM SERPL-MCNC: 1.9 MG/DL (ref 1.7–2.3)
MCH RBC QN AUTO: 28.8 PG (ref 27–31)
MCHC RBC AUTO-ENTMCNC: 32.5 G/DL (ref 32–36)
MCV RBC AUTO: 88.7 FL (ref 80–96)
MONOCYTES # BLD AUTO: 0.43 K/UL (ref 0–0.8)
MONOCYTES NFR BLD AUTO: 9.3 % (ref 2–6)
MPC BLD CALC-MCNC: 9.3 FL (ref 9.4–12.4)
NEUTROPHILS # BLD AUTO: 2.9 K/UL (ref 1.8–7.7)
NEUTROPHILS NFR BLD AUTO: 63 % (ref 53–65)
NRBC # BLD AUTO: 0 X10E3/UL
NRBC, AUTO (.00): 0 %
O+P STL CONC: NORMAL
PHOSPHATE SERPL-MCNC: 2.4 MG/DL (ref 2.5–4.5)
PLATELET # BLD AUTO: 220 K/UL (ref 150–400)
POTASSIUM SERPL-SCNC: 4.4 MMOL/L (ref 3.5–5.1)
RBC # BLD AUTO: 3.64 M/UL (ref 4.2–5.4)
SODIUM SERPL-SCNC: 139 MMOL/L (ref 136–145)
T3RU NFR SERPL: NORMAL %
TRICHROME SMEAR: NORMAL
WBC # BLD AUTO: 4.61 K/UL (ref 4.5–11)

## 2022-10-28 PROCEDURE — 83735 ASSAY OF MAGNESIUM: CPT | Performed by: STUDENT IN AN ORGANIZED HEALTH CARE EDUCATION/TRAINING PROGRAM

## 2022-10-28 PROCEDURE — 99232 PR SUBSEQUENT HOSPITAL CARE,LEVL II: ICD-10-PCS | Mod: ,,, | Performed by: STUDENT IN AN ORGANIZED HEALTH CARE EDUCATION/TRAINING PROGRAM

## 2022-10-28 PROCEDURE — 99232 SBSQ HOSP IP/OBS MODERATE 35: CPT | Mod: ,,, | Performed by: STUDENT IN AN ORGANIZED HEALTH CARE EDUCATION/TRAINING PROGRAM

## 2022-10-28 PROCEDURE — 11000001 HC ACUTE MED/SURG PRIVATE ROOM

## 2022-10-28 PROCEDURE — 25000003 PHARM REV CODE 250: Performed by: INTERNAL MEDICINE

## 2022-10-28 PROCEDURE — 94761 N-INVAS EAR/PLS OXIMETRY MLT: CPT

## 2022-10-28 PROCEDURE — 36415 COLL VENOUS BLD VENIPUNCTURE: CPT | Performed by: STUDENT IN AN ORGANIZED HEALTH CARE EDUCATION/TRAINING PROGRAM

## 2022-10-28 PROCEDURE — 63600175 PHARM REV CODE 636 W HCPCS: Performed by: STUDENT IN AN ORGANIZED HEALTH CARE EDUCATION/TRAINING PROGRAM

## 2022-10-28 PROCEDURE — 80048 BASIC METABOLIC PNL TOTAL CA: CPT | Performed by: STUDENT IN AN ORGANIZED HEALTH CARE EDUCATION/TRAINING PROGRAM

## 2022-10-28 PROCEDURE — 84100 ASSAY OF PHOSPHORUS: CPT | Performed by: STUDENT IN AN ORGANIZED HEALTH CARE EDUCATION/TRAINING PROGRAM

## 2022-10-28 PROCEDURE — 85025 COMPLETE CBC W/AUTO DIFF WBC: CPT | Performed by: STUDENT IN AN ORGANIZED HEALTH CARE EDUCATION/TRAINING PROGRAM

## 2022-10-28 PROCEDURE — 25000003 PHARM REV CODE 250: Performed by: STUDENT IN AN ORGANIZED HEALTH CARE EDUCATION/TRAINING PROGRAM

## 2022-10-28 PROCEDURE — 27000221 HC OXYGEN, UP TO 24 HOURS

## 2022-10-28 RX ORDER — HYDROCODONE BITARTRATE AND ACETAMINOPHEN 5; 325 MG/1; MG/1
1 TABLET ORAL EVERY 6 HOURS PRN
Qty: 15 TABLET | Refills: 0 | Status: SHIPPED | OUTPATIENT
Start: 2022-10-28 | End: 2023-12-05

## 2022-10-28 RX ADMIN — SODIUM CHLORIDE, POTASSIUM CHLORIDE, SODIUM LACTATE AND CALCIUM CHLORIDE: 600; 310; 30; 20 INJECTION, SOLUTION INTRAVENOUS at 11:10

## 2022-10-28 RX ADMIN — MONTELUKAST SODIUM 10 MG: 10 TABLET, FILM COATED ORAL at 09:10

## 2022-10-28 RX ADMIN — CITALOPRAM HYDROBROMIDE 40 MG: 20 TABLET ORAL at 09:10

## 2022-10-28 RX ADMIN — HEPARIN SODIUM 5000 UNITS: 5000 INJECTION INTRAVENOUS; SUBCUTANEOUS at 10:10

## 2022-10-28 RX ADMIN — HEPARIN SODIUM 5000 UNITS: 5000 INJECTION INTRAVENOUS; SUBCUTANEOUS at 03:10

## 2022-10-28 RX ADMIN — PANTOPRAZOLE SODIUM 40 MG: 40 TABLET, DELAYED RELEASE ORAL at 09:10

## 2022-10-28 RX ADMIN — OXCARBAZEPINE 150 MG: 150 TABLET, FILM COATED ORAL at 10:10

## 2022-10-28 RX ADMIN — ATORVASTATIN CALCIUM 20 MG: 20 TABLET, FILM COATED ORAL at 09:10

## 2022-10-28 RX ADMIN — HYDROCODONE BITARTRATE AND ACETAMINOPHEN 1 TABLET: 5; 325 TABLET ORAL at 10:10

## 2022-10-28 RX ADMIN — SODIUM CHLORIDE, POTASSIUM CHLORIDE, SODIUM LACTATE AND CALCIUM CHLORIDE: 600; 310; 30; 20 INJECTION, SOLUTION INTRAVENOUS at 03:10

## 2022-10-28 RX ADMIN — SODIUM CHLORIDE, POTASSIUM CHLORIDE, SODIUM LACTATE AND CALCIUM CHLORIDE: 600; 310; 30; 20 INJECTION, SOLUTION INTRAVENOUS at 05:10

## 2022-10-28 RX ADMIN — HEPARIN SODIUM 5000 UNITS: 5000 INJECTION INTRAVENOUS; SUBCUTANEOUS at 05:10

## 2022-10-28 RX ADMIN — LEVOTHYROXINE SODIUM 150 MCG: 0.15 TABLET ORAL at 05:10

## 2022-10-28 RX ADMIN — OXCARBAZEPINE 150 MG: 150 TABLET, FILM COATED ORAL at 09:10

## 2022-10-28 NOTE — PLAN OF CARE
Problem: Adult Inpatient Plan of Care  Goal: Plan of Care Review  Outcome: Ongoing, Progressing  Goal: Patient-Specific Goal (Individualized)  Outcome: Ongoing, Progressing  Goal: Absence of Hospital-Acquired Illness or Injury  Outcome: Ongoing, Progressing  Goal: Optimal Comfort and Wellbeing  Outcome: Ongoing, Progressing  Goal: Readiness for Transition of Care  Outcome: Ongoing, Progressing     Problem: Fluid and Electrolyte Imbalance (Acute Kidney Injury/Impairment)  Goal: Fluid and Electrolyte Balance  Outcome: Ongoing, Progressing     Problem: Oral Intake Inadequate (Acute Kidney Injury/Impairment)  Goal: Optimal Nutrition Intake  Outcome: Ongoing, Progressing     Problem: Renal Function Impairment (Acute Kidney Injury/Impairment)  Goal: Effective Renal Function  Outcome: Ongoing, Progressing      O-T Plasty Text: The defect edges were debeveled with a #15 scalpel blade.  Given the location of the defect, shape of the defect and the proximity to free margins an O-T plasty was deemed most appropriate.  Using a sterile surgical marker, an appropriate O-T plasty was drawn incorporating the defect and placing the expected incisions within the relaxed skin tension lines where possible.    The area thus outlined was incised deep to adipose tissue with a #15 scalpel blade.  The skin margins were undermined to an appropriate distance in all directions utilizing iris scissors.

## 2022-10-28 NOTE — PROGRESS NOTES
"[facility] Tele[facility] Hospital Medicine  Progress Note    Patient Name: Tamera Hernandez  MRN: [id number]  Patient Class: IP- Inpatient   Admission Date: 10/25/2022  Length of Stay: 3 days  Attending Physician: Jt Marroquin DO  Primary Care Provider: KATHY Juarez        Subjective:     Principal Problem:Cholelithiasis with acute cholecystitis        HPI:  62yof with pmh of hypothyroid, htn, depression, nicotine use who presents from surgery clinic for suspected cholecystitis.  Found to be in acute renal failure after lab evaluation.  She reports 5 days of intermittent vomiting and profuse watery diarrhea >6 times daily.  She is having some abdominal pain as well. Denies any fever chills, nothing like this has happened before.  She has no LUTS but has noticed a decrease in her UOP over the last 2 days. She feels thirsty and dehydrated but has had poor oral intake due to her illness. She does take meloxicam and has on a daily basis for "as long as I can remember".  Omeprazole as well. Bladder scan in ED revealed less than 100cc urine.  No dysuria, hematuria. Denies fever, chills, chest pain, palpitations, SOB. Per patient, BP well controlled at home.  ROS otherwise negative.       Overview/Hospital Course:  10/26- work up thus far indicates pre-renal etiology, will continue with IVF  10/27-renal function continues to improve. Continue IVF, OR today.  10/28- stable for discharge from surgical perspective. Will hold for renal failure one more day to see where Cr lands      Interval History: NAEO    Review of Systems   Constitutional:  Negative for chills, fatigue, fever and unexpected weight change.   HENT:  Negative for congestion, mouth sores and sore throat.    Eyes:  Negative for photophobia and visual disturbance.   Respiratory:  Negative for cough, chest tightness, shortness of breath and wheezing.    Cardiovascular:  Negative for chest pain, palpitations and leg swelling. "   Gastrointestinal:  Positive for abdominal pain, diarrhea and vomiting. Negative for blood in stool and nausea.   Endocrine: Negative for cold intolerance and heat intolerance.   Genitourinary:  Positive for decreased urine volume. Negative for difficulty urinating, dysuria, frequency, hematuria and urgency.   Musculoskeletal:  Negative for arthralgias, back pain and myalgias.   Skin:  Negative for pallor and rash.   Neurological:  Negative for tremors, seizures, syncope, weakness, numbness and headaches.   Hematological:  Does not bruise/bleed easily.   Psychiatric/Behavioral:  Negative for agitation, confusion, hallucinations and suicidal ideas.    Objective:     Vital Signs (Most Recent):  Temp: 98.1 °F (36.7 °C) (10/28/22 1100)  Pulse: 60 (10/28/22 1100)  Resp: 19 (10/28/22 1100)  BP: 100/70 (10/28/22 1100)  SpO2: 99 % (10/28/22 1100)   Vital Signs (24h Range):  Temp:  [97.6 °F (36.4 °C)-98.5 °F (36.9 °C)] 98.1 °F (36.7 °C)  Pulse:  [60-80] 60  Resp:  [14-19] 19  SpO2:  [61 %-100 %] 99 %  BP: ()/(42-78) 100/70     Weight: 86.1 kg (189 lb 13.1 oz)  Body mass index is 31.59 kg/m².  No intake or output data in the 24 hours ending 10/28/22 1214   Physical Exam  Vitals reviewed.   Constitutional:       Appearance: Normal appearance.   HENT:      Head: Normocephalic and atraumatic.      Nose: Nose normal.   Eyes:      Extraocular Movements: Extraocular movements intact.      Conjunctiva/sclera: Conjunctivae normal.   Neck:      Trachea: Trachea normal.   Cardiovascular:      Rate and Rhythm: Normal rate and regular rhythm.      Pulses: Normal pulses.      Heart sounds: Normal heart sounds.   Pulmonary:      Effort: Pulmonary effort is normal.      Breath sounds: Normal breath sounds and air entry.   Abdominal:      General: Bowel sounds are normal.      Palpations: Abdomen is soft.      Tenderness: There is abdominal tenderness (to deep palpation).   Musculoskeletal:         General: Normal range of motion.       Cervical back: Neck supple.      Comments: Normal tone, moves all extremities   Skin:     General: Skin is warm and dry.      Capillary Refill: Capillary refill takes less than 2 seconds.   Neurological:      General: No focal deficit present.      Mental Status: She is alert and oriented to person, place, and time.      Cranial Nerves: Cranial nerves 2-12 are intact.      Comments: Grossly normal motor and sensory function without focal deficit appreciated.   Psychiatric:         Mood and Affect: Mood and affect normal.         Behavior: Behavior normal. Behavior is cooperative.         Thought Content: Thought content normal.       Significant Labs: All pertinent labs within the past 24 hours have been reviewed.    Significant Imaging: I have reviewed all pertinent imaging results/findings within the past 24 hours.      Assessment/Plan:      * Cholelithiasis with acute cholecystitis  S/p daniel      Acute renal failure  Marked improvement. Continue IVF  Will watch one more day, plan to dc tomorrow        Nausea & vomiting  resolved        Diarrhea  Infectious work up normal        Obesity (BMI 30-39.9)  Body mass index is 31.59 kg/m². Morbid obesity complicates all aspects of disease management from diagnostic modalities to treatment. Weight loss encouraged and health benefits explained to patient.         Hyponatremia  Mild, trend        VTE Risk Mitigation (From admission, onward)         Ordered     heparin (porcine) injection 5,000 Units  Every 8 hours         10/25/22 1154     IP VTE HIGH RISK PATIENT  Once         10/25/22 1154     Place sequential compression device  Until discontinued         10/25/22 1154                Discharge Planning   FATOU:      Code Status: Full Code   Is the patient medically ready for discharge?:     Reason for patient still in hospital (select all that apply): Treatment                     Jt Marroquin DO  Department of [facility] Medicine   [facility]  Telemetry

## 2022-10-28 NOTE — PLAN OF CARE
Patient is calm, cooperative, alert and oriented x4. Lap sites has no drainage. Complained  of pain and received Norco ordered by Dr. Fernandez after Tylenol did not work. Patient denied additional needs. Stated that she urinated over 6 times over the night. Looks forward to going home today.

## 2022-10-28 NOTE — SUBJECTIVE & OBJECTIVE
Interval History: NAEO    Review of Systems   Constitutional:  Negative for chills, fatigue, fever and unexpected weight change.   HENT:  Negative for congestion, mouth sores and sore throat.    Eyes:  Negative for photophobia and visual disturbance.   Respiratory:  Negative for cough, chest tightness, shortness of breath and wheezing.    Cardiovascular:  Negative for chest pain, palpitations and leg swelling.   Gastrointestinal:  Positive for abdominal pain, diarrhea and vomiting. Negative for blood in stool and nausea.   Endocrine: Negative for cold intolerance and heat intolerance.   Genitourinary:  Positive for decreased urine volume. Negative for difficulty urinating, dysuria, frequency, hematuria and urgency.   Musculoskeletal:  Negative for arthralgias, back pain and myalgias.   Skin:  Negative for pallor and rash.   Neurological:  Negative for tremors, seizures, syncope, weakness, numbness and headaches.   Hematological:  Does not bruise/bleed easily.   Psychiatric/Behavioral:  Negative for agitation, confusion, hallucinations and suicidal ideas.    Objective:     Vital Signs (Most Recent):  Temp: 98.1 °F (36.7 °C) (10/28/22 1100)  Pulse: 60 (10/28/22 1100)  Resp: 19 (10/28/22 1100)  BP: 100/70 (10/28/22 1100)  SpO2: 99 % (10/28/22 1100)   Vital Signs (24h Range):  Temp:  [97.6 °F (36.4 °C)-98.5 °F (36.9 °C)] 98.1 °F (36.7 °C)  Pulse:  [60-80] 60  Resp:  [14-19] 19  SpO2:  [61 %-100 %] 99 %  BP: ()/(42-78) 100/70     Weight: 86.1 kg (189 lb 13.1 oz)  Body mass index is 31.59 kg/m².  No intake or output data in the 24 hours ending 10/28/22 1214   Physical Exam  Vitals reviewed.   Constitutional:       Appearance: Normal appearance.   HENT:      Head: Normocephalic and atraumatic.      Nose: Nose normal.   Eyes:      Extraocular Movements: Extraocular movements intact.      Conjunctiva/sclera: Conjunctivae normal.   Neck:      Trachea: Trachea normal.   Cardiovascular:      Rate and Rhythm: Normal rate  and regular rhythm.      Pulses: Normal pulses.      Heart sounds: Normal heart sounds.   Pulmonary:      Effort: Pulmonary effort is normal.      Breath sounds: Normal breath sounds and air entry.   Abdominal:      General: Bowel sounds are normal.      Palpations: Abdomen is soft.      Tenderness: There is abdominal tenderness (to deep palpation).   Musculoskeletal:         General: Normal range of motion.      Cervical back: Neck supple.      Comments: Normal tone, moves all extremities   Skin:     General: Skin is warm and dry.      Capillary Refill: Capillary refill takes less than 2 seconds.   Neurological:      General: No focal deficit present.      Mental Status: She is alert and oriented to person, place, and time.      Cranial Nerves: Cranial nerves 2-12 are intact.      Comments: Grossly normal motor and sensory function without focal deficit appreciated.   Psychiatric:         Mood and Affect: Mood and affect normal.         Behavior: Behavior normal. Behavior is cooperative.         Thought Content: Thought content normal.       Significant Labs: All pertinent labs within the past 24 hours have been reviewed.    Significant Imaging: I have reviewed all pertinent imaging results/findings within the past 24 hours.

## 2022-10-28 NOTE — ASSESSMENT & PLAN NOTE
10/26 lap cholecystectomy dr herrera tomorrow explained risks benefits  Npo after mn    10/27 lap daniel dr herrera    10/28 ok to dc home from surgery standpoint follow up with me 2 weeks norco E scribed

## 2022-10-28 NOTE — PROGRESS NOTES
[facility]  General Surgery  Progress Note    Subjective: feels better tolerating diet doing well     History of Present Illness:  Several days of diarrhea   N/V  Right side colicky pain not associated with eating  Seen yesterday in dr herrera [facility] for cholelithiasis on US  Sent to ER due to symptoms of dehydration   Previous hysterectomy      Post-Op Info:  Procedure(s) (LRB):  CHOLECYSTECTOMY, LAPAROSCOPIC (N/A)   1 Day Post-Op     No new subjective & objective note has been filed under this hospital service since the last note was generated.    Assessment/Plan:     * Cholelithiasis with acute cholecystitis  10/26 lap cholecystectomy dr herrera tomorrow explained risks benefits  Npo after mn    10/27 lap daniel dr herrera    10/28 ok to [place] home from surgery standpoint follow up with me 2 weeks norco E scribed     Diarrhea  Stool studies    Acute renal failure  10/26 improved from 7 to 4 with hydration        Nupur Walden, [facility]  General Surgery  [facility]

## 2022-10-29 VITALS
SYSTOLIC BLOOD PRESSURE: 96 MMHG | WEIGHT: 189.81 LBS | TEMPERATURE: 98 F | BODY MASS INDEX: 31.63 KG/M2 | RESPIRATION RATE: 18 BRPM | DIASTOLIC BLOOD PRESSURE: 49 MMHG | HEIGHT: 65 IN | OXYGEN SATURATION: 98 % | HEART RATE: 62 BPM

## 2022-10-29 PROBLEM — K80.00 CHOLELITHIASIS WITH ACUTE CHOLECYSTITIS: Status: RESOLVED | Noted: 2022-10-25 | Resolved: 2022-10-29

## 2022-10-29 PROBLEM — R19.7 DIARRHEA: Status: RESOLVED | Noted: 2022-10-25 | Resolved: 2022-10-29

## 2022-10-29 PROBLEM — E87.1 HYPONATREMIA: Status: RESOLVED | Noted: 2022-10-25 | Resolved: 2022-10-29

## 2022-10-29 PROBLEM — R11.2 NAUSEA & VOMITING: Status: RESOLVED | Noted: 2022-10-25 | Resolved: 2022-10-29

## 2022-10-29 PROBLEM — N17.9 ACUTE RENAL FAILURE: Status: RESOLVED | Noted: 2022-10-25 | Resolved: 2022-10-29

## 2022-10-29 LAB
ANION GAP SERPL CALCULATED.3IONS-SCNC: 11 MMOL/L (ref 7–16)
BASOPHILS # BLD AUTO: 0.02 K/UL (ref 0–0.2)
BASOPHILS NFR BLD AUTO: 0.4 % (ref 0–1)
BUN SERPL-MCNC: 22 MG/DL (ref 7–18)
BUN/CREAT SERPL: 14 (ref 6–20)
CALCIUM SERPL-MCNC: 7.8 MG/DL (ref 8.5–10.1)
CHLORIDE SERPL-SCNC: 113 MMOL/L (ref 98–107)
CO2 SERPL-SCNC: 24 MMOL/L (ref 21–32)
CREAT SERPL-MCNC: 1.53 MG/DL (ref 0.55–1.02)
DIFFERENTIAL METHOD BLD: ABNORMAL
EGFR (NO RACE VARIABLE) (RUSH/TITUS): 38 ML/MIN/1.73M²
EOSINOPHIL # BLD AUTO: 0.16 K/UL (ref 0–0.5)
EOSINOPHIL NFR BLD AUTO: 3.6 % (ref 1–4)
ERYTHROCYTE [DISTWIDTH] IN BLOOD BY AUTOMATED COUNT: 13.2 % (ref 11.5–14.5)
GLUCOSE SERPL-MCNC: 101 MG/DL (ref 74–106)
HCT VFR BLD AUTO: 30.7 % (ref 38–47)
HGB BLD-MCNC: 9.8 G/DL (ref 12–16)
IMM GRANULOCYTES # BLD AUTO: 0.17 K/UL (ref 0–0.04)
IMM GRANULOCYTES NFR BLD: 3.8 % (ref 0–0.4)
LYMPHOCYTES # BLD AUTO: 1.99 K/UL (ref 1–4.8)
LYMPHOCYTES NFR BLD AUTO: 44.4 % (ref 27–41)
MAGNESIUM SERPL-MCNC: 1.7 MG/DL (ref 1.7–2.3)
MCH RBC QN AUTO: 28.3 PG (ref 27–31)
MCHC RBC AUTO-ENTMCNC: 31.9 G/DL (ref 32–36)
MCV RBC AUTO: 88.7 FL (ref 80–96)
MONOCYTES # BLD AUTO: 0.45 K/UL (ref 0–0.8)
MONOCYTES NFR BLD AUTO: 10 % (ref 2–6)
MPC BLD CALC-MCNC: 9.1 FL (ref 9.4–12.4)
NEUTROPHILS # BLD AUTO: 1.69 K/UL (ref 1.8–7.7)
NEUTROPHILS NFR BLD AUTO: 37.8 % (ref 53–65)
NRBC # BLD AUTO: 0 X10E3/UL
NRBC, AUTO (.00): 0 %
PHOSPHATE SERPL-MCNC: 2.6 MG/DL (ref 2.5–4.5)
PLATELET # BLD AUTO: 210 K/UL (ref 150–400)
POTASSIUM SERPL-SCNC: 4.2 MMOL/L (ref 3.5–5.1)
RBC # BLD AUTO: 3.46 M/UL (ref 4.2–5.4)
SODIUM SERPL-SCNC: 144 MMOL/L (ref 136–145)
WBC # BLD AUTO: 4.48 K/UL (ref 4.5–11)

## 2022-10-29 PROCEDURE — 99239 PR HOSPITAL DISCHARGE DAY,>30 MIN: ICD-10-PCS | Mod: ,,, | Performed by: STUDENT IN AN ORGANIZED HEALTH CARE EDUCATION/TRAINING PROGRAM

## 2022-10-29 PROCEDURE — 80048 BASIC METABOLIC PNL TOTAL CA: CPT | Performed by: STUDENT IN AN ORGANIZED HEALTH CARE EDUCATION/TRAINING PROGRAM

## 2022-10-29 PROCEDURE — 63600175 PHARM REV CODE 636 W HCPCS: Performed by: STUDENT IN AN ORGANIZED HEALTH CARE EDUCATION/TRAINING PROGRAM

## 2022-10-29 PROCEDURE — 36415 COLL VENOUS BLD VENIPUNCTURE: CPT | Performed by: STUDENT IN AN ORGANIZED HEALTH CARE EDUCATION/TRAINING PROGRAM

## 2022-10-29 PROCEDURE — 85025 COMPLETE CBC W/AUTO DIFF WBC: CPT | Performed by: STUDENT IN AN ORGANIZED HEALTH CARE EDUCATION/TRAINING PROGRAM

## 2022-10-29 PROCEDURE — 94761 N-INVAS EAR/PLS OXIMETRY MLT: CPT

## 2022-10-29 PROCEDURE — 25000003 PHARM REV CODE 250: Performed by: STUDENT IN AN ORGANIZED HEALTH CARE EDUCATION/TRAINING PROGRAM

## 2022-10-29 PROCEDURE — 83735 ASSAY OF MAGNESIUM: CPT | Performed by: STUDENT IN AN ORGANIZED HEALTH CARE EDUCATION/TRAINING PROGRAM

## 2022-10-29 PROCEDURE — 99239 HOSP IP/OBS DSCHRG MGMT >30: CPT | Mod: ,,, | Performed by: STUDENT IN AN ORGANIZED HEALTH CARE EDUCATION/TRAINING PROGRAM

## 2022-10-29 PROCEDURE — 84100 ASSAY OF PHOSPHORUS: CPT | Performed by: STUDENT IN AN ORGANIZED HEALTH CARE EDUCATION/TRAINING PROGRAM

## 2022-10-29 PROCEDURE — 27000221 HC OXYGEN, UP TO 24 HOURS

## 2022-10-29 RX ADMIN — MONTELUKAST SODIUM 10 MG: 10 TABLET, FILM COATED ORAL at 08:10

## 2022-10-29 RX ADMIN — ATORVASTATIN CALCIUM 20 MG: 20 TABLET, FILM COATED ORAL at 08:10

## 2022-10-29 RX ADMIN — SODIUM CHLORIDE, POTASSIUM CHLORIDE, SODIUM LACTATE AND CALCIUM CHLORIDE: 600; 310; 30; 20 INJECTION, SOLUTION INTRAVENOUS at 12:10

## 2022-10-29 RX ADMIN — OXCARBAZEPINE 150 MG: 150 TABLET, FILM COATED ORAL at 08:10

## 2022-10-29 RX ADMIN — SODIUM CHLORIDE, POTASSIUM CHLORIDE, SODIUM LACTATE AND CALCIUM CHLORIDE: 600; 310; 30; 20 INJECTION, SOLUTION INTRAVENOUS at 05:10

## 2022-10-29 RX ADMIN — LEVOTHYROXINE SODIUM 150 MCG: 0.15 TABLET ORAL at 05:10

## 2022-10-29 RX ADMIN — PANTOPRAZOLE SODIUM 40 MG: 40 TABLET, DELAYED RELEASE ORAL at 08:10

## 2022-10-29 RX ADMIN — HEPARIN SODIUM 5000 UNITS: 5000 INJECTION INTRAVENOUS; SUBCUTANEOUS at 05:10

## 2022-10-29 RX ADMIN — CITALOPRAM HYDROBROMIDE 40 MG: 20 TABLET ORAL at 08:10

## 2022-10-29 NOTE — PLAN OF CARE
Problem: Fluid and Electrolyte Imbalance (Acute Kidney Injury/Impairment)  Goal: Fluid and Electrolyte Balance  Outcome: Ongoing, Progressing  Intervention: Monitor and Manage Fluid and Electrolyte Balance  Flowsheets (Taken 10/28/2022 2351)  Fluid/Electrolyte Management: intravenous fluids adjusted     Problem: Oral Intake Inadequate (Acute Kidney Injury/Impairment)  Goal: Optimal Nutrition Intake  Intervention: Promote and Optimize Nutrition  Flowsheets (Taken 10/28/2022 2351)  Oral Nutrition Promotion: rest periods promoted     Problem: Renal Function Impairment (Acute Kidney Injury/Impairment)  Goal: Effective Renal Function  Intervention: Monitor and Support Renal Function  Flowsheets (Taken 10/28/2022 2351)  Medication Review/Management: medications reviewed

## 2022-10-29 NOTE — NURSING
1900 Received bedside report from dayshift nurse. Will resume care of this patient at this time.    2020 Entered patient room for initial HTT assessment. Pt is resting in bed watching TV, IV infusing LR @ 150mL/hr. Refused scd's. Patient ambulating in room at this time. Lap sites with scant drainage. Changed tele pads and batteries. Left comfortable in bed with call bell and personal items in reach.    2211 Administered HS medications and PRN pain medication, will f/u on efficacy using pain scale within the hour. Denies further needs. Left patient comfortable in bed with nadn, call bell and personal items in reach.     2310 F/u on prn pain med admin. Patient is nodding off to sleep at this time. Denies needs. Reports pain is 0 (0-10)    0033 hung new bag of LR. Pt resting in bed with nadn, cb and personal items in reach    0100 Patient is resting in bed. Appears to be sleeping with NADN. Rise and fall of chest noted. Call bell and all personal items in reach. Bed alarm set.    0300  Pt is resting still at this time. Rise and fall of chest noted. Call bell/ personal items in reach.     0500 Patient is resting in bed. Appears to be sleeping with NADN. Rise and fall of chest noted. Call bell and all personal items in reach. Bed alarm set.     0553 Administered am medications. Pt is comfortable in bed and all needs met at this time. Family at bedside. Cb and all personal items in reach. Will report to oncoming shift.    0710 Gave bedside report to dayshift nurse at this time. Care released at this time.

## 2022-10-29 NOTE — DISCHARGE SUMMARY
"[facility] - [address] [place] [address] Medicine  Discharge Summary      Patient Name: Tamera Hernandez  MRN: [id number]  Patient Class: IP- Inpatient  Admission Date: 10/25/2022  Hospital Length of Stay: 4 days  Discharge Date and Time:  10/29/2022 9:53 AM  Attending Physician: Jt Marroquin DO   Discharging Provider: Jt Marroquin DO  Primary Care Provider: KATHY Juarez      HPI:   62yof with pmh of hypothyroid, htn, depression, nicotine use who presents from surgery clinic for suspected cholecystitis.  Found to be in acute renal failure after lab evaluation.  She reports 5 days of intermittent vomiting and profuse watery diarrhea >6 times daily.  She is having some abdominal pain as well. Denies any fever chills, nothing like this has happened before.  She has no LUTS but has noticed a decrease in her UOP over the last 2 days. She feels thirsty and dehydrated but has had poor oral intake due to her illness. She does take meloxicam and has on a daily basis for "as long as I can remember".  Omeprazole as well. Bladder scan in ED revealed less than 100cc urine.  No dysuria, hematuria. Denies fever, chills, chest pain, palpitations, SOB. Per patient, BP well controlled at home.  ROS otherwise negative.       Procedure(s) (LRB):  CHOLECYSTECTOMY, LAPAROSCOPIC (N/A)      Hospital Course:   10/26- work up thus far indicates pre-renal etiology, will continue with IVF  10/27-renal function continues to improve. Continue IVF, OR today.  10/28- stable for discharge from surgical perspective. Will hold for renal failure one more day to see where Cr lands  10/29-renal function continues to improve. Stable for discharge today. Encouraged fluid intake at home. Needs to follow up with PCP in 1 week. No more NSAIDS-discussed with patient       Goals of Care Treatment Preferences:  Code Status: Full Code      Consults:   Consults (From admission, onward)        Status Ordering Provider     Inpatient " consult to General Surgery  Once        Provider:  DO Asher James THOMAS          Obesity (BMI 30-39.9)  Body mass index is 31.59 kg/m². Morbid obesity complicates all aspects of disease management from diagnostic modalities to treatment. Weight loss encouraged and health benefits explained to patient.           Final Active Diagnoses:    Diagnosis Date Noted POA    Obesity (BMI 30-39.9) [E66.9] 10/25/2022 Yes      Problems Resolved During this Admission:    Diagnosis Date Noted Date Resolved POA    PRINCIPAL PROBLEM:  Cholelithiasis with acute cholecystitis [K80.00] 10/25/2022 10/29/2022 Yes    Acute renal failure [N17.9] 10/25/2022 10/29/2022 Yes    Hyponatremia [E87.1] 10/25/2022 10/29/2022 Yes    Diarrhea [R19.7] 10/25/2022 10/29/2022 Yes    Nausea & vomiting [R11.2] 10/25/2022 10/29/2022 Yes       Discharged Condition: good    Disposition: Home or Self Care    Follow Up:   Follow-up Information     LANA Chen Follow up.    Specialties: Surgery, Emergency Medicine  Contact information:  [address] [place] [address] [facility] [place] [address]  [phone]             KATHY Juarez Follow up in 1 week(s).    Specialty: Family Medicine  Contact information:  [address] [facility]  [place] [address] [phone]                       Patient Instructions:      Diet Cardiac     Activity as tolerated       Significant Diagnostic Studies: Labs: All labs within the past 24 hours have been reviewed    Pending Diagnostic Studies:     Procedure Component Value Units Date/Time    EXTRA TUBES [[id number]] Updated: 10/26/22 1438    Order Status: Sent Lab Status: In process     Specimen: Blood, Venous     Narrative:      The following orders were created for panel order EXTRA TUBES.  Procedure                               Abnormality         Status                     ---------                               -----------          ------                     Lavender Top Hold[[id number]]                                In process                   Please view results for these tests on the individual orders.         Medications:  Reconciled Home Medications:      Medication List      START taking these medications    HYDROcodone-acetaminophen 5-325 mg per tablet  Commonly known as: NORCO  Take 1 tablet by mouth every 6 (six) hours as needed for Pain.        CONTINUE taking these medications    aspirin 81 MG EC tablet  Commonly known as: ECOTRIN  Take 81 mg by mouth once daily.     atorvastatin 20 MG tablet  Commonly known as: LIPITOR  Take 20 mg by mouth once daily.     citalopram 40 MG tablet  Commonly known as: CeleXA     dextroamphetamine-amphetamine 25 MG 24 hr capsule  Commonly known as: ADDERALL XR  Take by mouth every morning.     estradioL 1 MG tablet  Commonly known as: ESTRACE  Take 1 mg by mouth once daily.     EUTHYROX 150 MCG tablet  Generic drug: levothyroxine     famotidine 20 MG tablet  Commonly known as: PEPCID  Take 20 mg by mouth 2 (two) times daily.     ipratropium 42 mcg (0.06 %) nasal spray  Commonly known as: ATROVENT  USE 2 SPRAY(S) IN EACH NOSTRIL 4 TIMES DAILY     losartan-hydrochlorothiazide 100-25 mg 100-25 mg per tablet  Commonly known as: HYZAAR     montelukast 10 mg tablet  Commonly known as: SINGULAIR  TAKE 1 TABLET BY MOUTH ONCE DAILY IN THE EVENING FOR 90 DAYS     omeprazole 40 MG capsule  Commonly known as: PRILOSEC  Take 40 mg by mouth once daily.     OXcarbazepine 300 MG Tab  Commonly known as: TRILEPTAL  Take 150 mg by mouth 2 (two) times daily.        STOP taking these medications    meloxicam 15 MG tablet  Commonly known as: MOBIC            Indwelling Lines/Drains at time of discharge:   Lines/Drains/Airways     None                 Time spent on the discharge of patient: >30 minutes         Jt Marroquin DO  Department of Hospital Medicine  [facility] Tele[facility]

## 2022-10-29 NOTE — PLAN OF CARE
Problem: Adult Inpatient Plan of Care  Goal: Plan of Care Review  10/29/2022 1054 by Nan Crandall RN  Outcome: Met  10/29/2022 0806 by Nan Crandall RN  Outcome: Ongoing, Progressing  10/29/2022 0806 by Nan Crandall RN  Outcome: Ongoing, Progressing  Goal: Patient-Specific Goal (Individualized)  10/29/2022 1054 by Nan Crandall RN  Outcome: Met  10/29/2022 0806 by Nan Crandall RN  Outcome: Ongoing, Progressing  10/29/2022 0806 by Nan Crandall RN  Outcome: Ongoing, Progressing  Goal: Absence of Hospital-Acquired Illness or Injury  10/29/2022 1054 by Nan Crandall RN  Outcome: Met  10/29/2022 0806 by Nan Crandall RN  Outcome: Ongoing, Progressing  10/29/2022 0806 by Nan Crandall RN  Outcome: Ongoing, Progressing  Goal: Optimal Comfort and Wellbeing  10/29/2022 1054 by Nan Crandall RN  Outcome: Met  10/29/2022 0806 by Nan Crandall RN  Outcome: Ongoing, Progressing  10/29/2022 0806 by Nan Crandall RN  Outcome: Ongoing, Progressing  Goal: Readiness for Transition of Care  10/29/2022 1054 by Nan Crandall RN  Outcome: Met  10/29/2022 0806 by Nan Crandall RN  Outcome: Ongoing, Progressing  10/29/2022 0806 by Nan Crandall RN  Outcome: Ongoing, Progressing     Problem: Fluid and Electrolyte Imbalance (Acute Kidney Injury/Impairment)  Goal: Fluid and Electrolyte Balance  10/29/2022 1054 by Nan Crandall RN  Outcome: Met  10/29/2022 0806 by Nan Crandall RN  Outcome: Ongoing, Progressing  10/29/2022 0806 by Nan Crandall RN  Outcome: Ongoing, Progressing     Problem: Oral Intake Inadequate (Acute Kidney Injury/Impairment)  Goal: Optimal Nutrition Intake  10/29/2022 1054 by Nan Crandall RN  Outcome: Met  10/29/2022 0806 by Nan Crandall RN  Outcome: Ongoing, Progressing  10/29/2022 0806 by Nan Crandall RN  Outcome: Ongoing, Progressing     Problem: Renal Function Impairment (Acute Kidney Injury/Impairment)  Goal: Effective Renal Function  10/29/2022 1054 by Nan Crandall RN  Outcome: Met  10/29/2022 0806 by Nan Crandall RN  Outcome: Ongoing,  Progressing  10/29/2022 0806 by Nan Crandall RN  Outcome: Ongoing, Progressing

## 2022-10-29 NOTE — PLAN OF CARE
Problem: Adult Inpatient Plan of Care  Goal: Plan of Care Review  10/29/2022 0806 by Nan Crandall RN  Outcome: Ongoing, Progressing  10/29/2022 0806 by Nan Crandall RN  Outcome: Ongoing, Progressing  Goal: Patient-Specific Goal (Individualized)  10/29/2022 0806 by Nan Crandall RN  Outcome: Ongoing, Progressing  10/29/2022 0806 by Nan Crandall RN  Outcome: Ongoing, Progressing  Goal: Absence of Hospital-Acquired Illness or Injury  10/29/2022 0806 by Nan Crandall RN  Outcome: Ongoing, Progressing  10/29/2022 0806 by Nan Crandall RN  Outcome: Ongoing, Progressing  Goal: Optimal Comfort and Wellbeing  10/29/2022 0806 by Nan Crandall RN  Outcome: Ongoing, Progressing  10/29/2022 0806 by Nan Crandall RN  Outcome: Ongoing, Progressing  Goal: Readiness for Transition of Care  10/29/2022 0806 by Nan Crandall RN  Outcome: Ongoing, Progressing  10/29/2022 0806 by Nan Crandall RN  Outcome: Ongoing, Progressing     Problem: Fluid and Electrolyte Imbalance (Acute Kidney Injury/Impairment)  Goal: Fluid and Electrolyte Balance  10/29/2022 0806 by Nan Crandall RN  Outcome: Ongoing, Progressing  10/29/2022 0806 by Nan Crandall RN  Outcome: Ongoing, Progressing     Problem: Oral Intake Inadequate (Acute Kidney Injury/Impairment)  Goal: Optimal Nutrition Intake  10/29/2022 0806 by Nan Crandall RN  Outcome: Ongoing, Progressing  10/29/2022 0806 by Nan Crandall RN  Outcome: Ongoing, Progressing     Problem: Renal Function Impairment (Acute Kidney Injury/Impairment)  Goal: Effective Renal Function  10/29/2022 0806 by Nan Crandall RN  Outcome: Ongoing, Progressing  10/29/2022 0806 by Nan Crandall RN  Outcome: Ongoing, Progressing

## 2022-10-31 NOTE — ANESTHESIA POSTPROCEDURE EVALUATION
Anesthesia Post Evaluation    Patient: Tamera Hernandez    Procedure(s) Performed: Procedure(s) (LRB):  CHOLECYSTECTOMY, LAPAROSCOPIC (N/A)    Final Anesthesia Type: general      Patient location during evaluation: PACU  Patient participation: Yes- Able to Participate  Level of consciousness: awake and alert  Post-procedure vital signs: reviewed and stable  Pain management: adequate  Airway patency: patent  BUBBA mitigation strategies: Multimodal analgesia  PONV status at discharge: No PONV  Anesthetic complications: no      Cardiovascular status: blood pressure returned to baseline  Respiratory status: unassisted  Hydration status: euvolemic  Follow-up not needed.          Vitals Value Taken Time   BP 96/49 10/29/22 0715   Temp 36.7 °C (98 °F) 10/29/22 0715   Pulse 62 10/29/22 0715   Resp 18 10/29/22 0715   SpO2 98 % 10/29/22 0715         Event Time   Out of Recovery 10/27/2022 15:36:09         Pain/Sammie Score: No data recorded

## 2022-11-10 ENCOUNTER — OFFICE VISIT (OUTPATIENT)
Dept: SURGERY | Facility: CLINIC | Age: 62
End: 2022-11-10
Payer: COMMERCIAL

## 2022-11-10 DIAGNOSIS — Z09 POSTOP CHECK: Primary | ICD-10-CM

## 2022-11-10 PROCEDURE — 1111F DSCHRG MED/CURRENT MED MERGE: CPT | Mod: CPTII,,, | Performed by: NURSE PRACTITIONER

## 2022-11-10 PROCEDURE — 1160F RVW MEDS BY RX/DR IN RCRD: CPT | Mod: CPTII,,, | Performed by: NURSE PRACTITIONER

## 2022-11-10 PROCEDURE — 99212 OFFICE O/P EST SF 10 MIN: CPT | Mod: S$PBB,,, | Performed by: NURSE PRACTITIONER

## 2022-11-10 PROCEDURE — 1159F PR MEDICATION LIST DOCUMENTED IN MEDICAL RECORD: ICD-10-PCS | Mod: CPTII,,, | Performed by: NURSE PRACTITIONER

## 2022-11-10 PROCEDURE — 1160F PR REVIEW ALL MEDS BY PRESCRIBER/CLIN PHARMACIST DOCUMENTED: ICD-10-PCS | Mod: CPTII,,, | Performed by: NURSE PRACTITIONER

## 2022-11-10 PROCEDURE — 1111F PR DISCHARGE MEDS RECONCILED W/ CURRENT OUTPATIENT MED LIST: ICD-10-PCS | Mod: CPTII,,, | Performed by: NURSE PRACTITIONER

## 2022-11-10 PROCEDURE — 99213 OFFICE O/P EST LOW 20 MIN: CPT | Mod: PBBFAC | Performed by: NURSE PRACTITIONER

## 2022-11-10 PROCEDURE — 99212 PR OFFICE/OUTPT VISIT, EST, LEVL II, 10-19 MIN: ICD-10-PCS | Mod: S$PBB,,, | Performed by: NURSE PRACTITIONER

## 2022-11-10 PROCEDURE — 1159F MED LIST DOCD IN RCRD: CPT | Mod: CPTII,,, | Performed by: NURSE PRACTITIONER

## 2022-11-10 NOTE — PROGRESS NOTES
Tamera Hernandez is a 62 y.o. female patient.   No diagnosis found.  History reviewed. No pertinent past medical history.  Past Surgical History Pertinent Negatives:   Procedure Date Noted    AUGMENTATION OF BREAST 01/19/2022    BREAST BIOPSY 01/19/2022    BREAST CYST ASPIRATION 01/19/2022    BREAST CYST EXCISION 01/19/2022    BREAST LUMPECTOMY 01/19/2022    BREAST RECONSTRUCTION 01/19/2022    MASTECTOMY 01/19/2022    TOTAL REDUCTION MAMMOPLASTY 01/19/2022     Scheduled Meds:  Continuous Infusions:  PRN Meds:    Review of patient's allergies indicates:  No Known Allergies  There are no hospital problems to display for this patient.    There were no vitals taken for this visit.    Subjective  doing well, feels great tolerating diet, no n/v, regular bowel movements  Objective  abdomen soft/incisions healing well, no signs of infection   Assessment & Plan     S/P laparoscopic cholecystectomy 10/28/2022 per dr herrera   Chronic cholecystitis and cholelithiasis      Educated on diet/activity  Follow up PRN      LANA Chen  11/10/2022

## 2022-11-28 ENCOUNTER — OFFICE VISIT (OUTPATIENT)
Dept: NEPHROLOGY | Facility: CLINIC | Age: 62
End: 2022-11-28
Payer: COMMERCIAL

## 2022-11-28 ENCOUNTER — TELEPHONE (OUTPATIENT)
Dept: NEPHROLOGY | Facility: CLINIC | Age: 62
End: 2022-11-28
Payer: COMMERCIAL

## 2022-11-28 VITALS
OXYGEN SATURATION: 98 % | DIASTOLIC BLOOD PRESSURE: 72 MMHG | HEIGHT: 65 IN | HEART RATE: 65 BPM | WEIGHT: 189.81 LBS | RESPIRATION RATE: 18 BRPM | SYSTOLIC BLOOD PRESSURE: 120 MMHG | BODY MASS INDEX: 31.63 KG/M2

## 2022-11-28 DIAGNOSIS — N17.9 AKI (ACUTE KIDNEY INJURY): ICD-10-CM

## 2022-11-28 DIAGNOSIS — I10 ESSENTIAL HYPERTENSION: ICD-10-CM

## 2022-11-28 DIAGNOSIS — N18.31 STAGE 3A CHRONIC KIDNEY DISEASE: Primary | ICD-10-CM

## 2022-11-28 PROCEDURE — 3008F PR BODY MASS INDEX (BMI) DOCUMENTED: ICD-10-PCS | Mod: CPTII,,, | Performed by: INTERNAL MEDICINE

## 2022-11-28 PROCEDURE — 3074F SYST BP LT 130 MM HG: CPT | Mod: CPTII,,, | Performed by: INTERNAL MEDICINE

## 2022-11-28 PROCEDURE — 3078F PR MOST RECENT DIASTOLIC BLOOD PRESSURE < 80 MM HG: ICD-10-PCS | Mod: CPTII,,, | Performed by: INTERNAL MEDICINE

## 2022-11-28 PROCEDURE — 99204 OFFICE O/P NEW MOD 45 MIN: CPT | Mod: S$PBB,,, | Performed by: INTERNAL MEDICINE

## 2022-11-28 PROCEDURE — 3078F DIAST BP <80 MM HG: CPT | Mod: CPTII,,, | Performed by: INTERNAL MEDICINE

## 2022-11-28 PROCEDURE — 3074F PR MOST RECENT SYSTOLIC BLOOD PRESSURE < 130 MM HG: ICD-10-PCS | Mod: CPTII,,, | Performed by: INTERNAL MEDICINE

## 2022-11-28 PROCEDURE — 1159F PR MEDICATION LIST DOCUMENTED IN MEDICAL RECORD: ICD-10-PCS | Mod: CPTII,,, | Performed by: INTERNAL MEDICINE

## 2022-11-28 PROCEDURE — 3066F PR DOCUMENTATION OF TREATMENT FOR NEPHROPATHY: ICD-10-PCS | Mod: CPTII,,, | Performed by: INTERNAL MEDICINE

## 2022-11-28 PROCEDURE — 1111F PR DISCHARGE MEDS RECONCILED W/ CURRENT OUTPATIENT MED LIST: ICD-10-PCS | Mod: CPTII,,, | Performed by: INTERNAL MEDICINE

## 2022-11-28 PROCEDURE — 99204 PR OFFICE/OUTPT VISIT, NEW, LEVL IV, 45-59 MIN: ICD-10-PCS | Mod: S$PBB,,, | Performed by: INTERNAL MEDICINE

## 2022-11-28 PROCEDURE — 3008F BODY MASS INDEX DOCD: CPT | Mod: CPTII,,, | Performed by: INTERNAL MEDICINE

## 2022-11-28 PROCEDURE — 1111F DSCHRG MED/CURRENT MED MERGE: CPT | Mod: CPTII,,, | Performed by: INTERNAL MEDICINE

## 2022-11-28 PROCEDURE — 1159F MED LIST DOCD IN RCRD: CPT | Mod: CPTII,,, | Performed by: INTERNAL MEDICINE

## 2022-11-28 PROCEDURE — 99215 OFFICE O/P EST HI 40 MIN: CPT | Mod: PBBFAC | Performed by: INTERNAL MEDICINE

## 2022-11-28 PROCEDURE — 3066F NEPHROPATHY DOC TX: CPT | Mod: CPTII,,, | Performed by: INTERNAL MEDICINE

## 2022-11-28 RX ORDER — CALCIUM CARBONATE 600 MG
600 TABLET ORAL ONCE
COMMUNITY

## 2022-11-28 RX ORDER — VITAMIN B COMPLEX
1 CAPSULE ORAL DAILY
COMMUNITY

## 2022-11-28 RX ORDER — MELOXICAM 15 MG/1
1 TABLET ORAL
COMMUNITY
End: 2023-12-04

## 2022-11-28 RX ORDER — IBUPROFEN 100 MG/5ML
1000 SUSPENSION, ORAL (FINAL DOSE FORM) ORAL DAILY
COMMUNITY

## 2022-11-28 NOTE — PROGRESS NOTES
[facility] History and Physical  Patient Name: Tamera Hernandez  MRN: [id number]  Age: 62 y.o.  : 1960    Date: 2022 10:20 AM    Chief Complaint: Establish Care    HPI :   Ms Hernandez presents to Nephrology clinic to establish care. She is followed by KATHY Juarez as her primary care provider. She is referred after recent hospitalization during which she had an KHADIJAH.     HTN: diagnosed about 4 years ago. Current regimen includes losartan-hydrochlorothiazide 100-25 mg daily. Well controlled.     DLD: on statin    Insomnia: on adderall     Arthritis,chronic back pain: off mobic     History of nephrolithiasis: history of lithotripsy. Been several years. No recent stones.  Unsure type.     Nephrology history:   Patient recently hospitalized 10/25-10/29 due to N/V/D associated with cholecystitis. Patient found to have pre-renal KHADIJAH during that time. Peak sCr 7.15 during her stay. She improved with IVF. She is now s/p cholecystectomy on 10/28 with Dr. Hernandez.      Patient denies any CP, SOB, peripheral edema, dysuria, hematuria, changes in urinary habits, or increased frequency of urination. No FH of kidney disease, no recurrent UTIs. No OTC medications including NSAIDs. She was previously on mobic for years.      Past Medical History:  has no past medical history on file.     Past Surgical History:   has a past surgical history that includes Hysterectomy; Oophorectomy; Laparoscopic cholecystectomy (N/A, 10/27/2022); and Tonsillectomy and adenoidectomy.     Family History:  family history is not on file. No family history of kidney disease.     Social History:   reports that she has been smoking vaping with nicotine. She has never used smokeless tobacco. She reports that she does not drink alcohol and does not use drugs. 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     Allergies: has No Known Allergies.      Medications: Reviewed including OTC medications, herbal supplements, and NSAIDS.     Old records have been reviewed.      Review of Systems:  ROS: A 10 point ROS was completed and found to be negative except for that mentioned above.          Physical Exam:  Vitals:    11/28/22 1022   BP: 120/72   Pulse: 65   Resp: 18       Constitutional: sitting in chair, in NAD  Eyes: EOMI, white sclera  ENMT: moist mucus membranes, nares patent  Cardiovascular: normal rate, S1/S2 noted, no edema  Respiratory: symmetrical chest expansion, CTA-B  Gastrointestinal: +BS, soft, NT/ND  Musculoskeletal: normal, no joint erythema/effusions  Skin: no rash, no purpura, warm extremities  Neurological: Alert and Oriented x 4, afocal    Labs:   Lab Results   Component Value Date     10/29/2022    K 4.2 10/29/2022    CREATININE 1.53 (H) 10/29/2022    ALT 31 10/25/2022    AST 22 10/25/2022    TSH 1.570 10/25/2022    WBC 4.48 (L) 10/29/2022    HGB 9.8 (L) 10/29/2022    HCT 30.7 (L) 10/29/2022     10/29/2022        Otherwise Reviewed    Assessment/Plan:       CKD stage undetermined with recent KHADIJAH, in setting of pre-renal losses. Baseline sCr  TBD. Repeat labs today. Counseled to avoid nephrotoxic agents such as NSAIDs. Will obtain baseline renal ultrasound.    Proteinuria: urine Prot:Creat ratio is pending . Patient is on RAAS blockade with ARB    Anemia: cbc today    BMD: Calcium and Phosphorus PTH Vit D today    HTN: well controlled with current meds     RTC 6 months with CBC, RFP, UA, urine for Prot:creat ratio, PTH, Vit D      Alisha S. Parker, DO [facility]   11/28/2022

## 2023-01-25 ENCOUNTER — HOSPITAL ENCOUNTER (OUTPATIENT)
Dept: RADIOLOGY | Facility: HOSPITAL | Age: 63
Discharge: HOME | End: 2023-01-25
Attending: NURSE PRACTITIONER
Payer: COMMERCIAL

## 2023-01-25 DIAGNOSIS — Z12.31 BREAST CANCER SCREENING BY MAMMOGRAM: ICD-10-CM

## 2023-01-25 PROCEDURE — 77067 SCR MAMMO BI INCL CAD: CPT | Mod: TC

## 2023-05-30 ENCOUNTER — HOSPITAL ENCOUNTER (OUTPATIENT)
Dept: RADIOLOGY | Facility: HOSPITAL | Age: 63
Discharge: HOME | End: 2023-05-30
Attending: INTERNAL MEDICINE
Payer: COMMERCIAL

## 2023-05-30 DIAGNOSIS — N17.9 AKI (ACUTE KIDNEY INJURY): ICD-10-CM

## 2023-05-30 DIAGNOSIS — N18.31 STAGE 3A CHRONIC KIDNEY DISEASE: ICD-10-CM

## 2023-05-30 PROCEDURE — 76770 US EXAM ABDO BACK WALL COMP: CPT | Mod: 26,,, | Performed by: RADIOLOGY

## 2023-05-30 PROCEDURE — 76770 US KIDNEY: ICD-10-PCS | Mod: 26,,, | Performed by: RADIOLOGY

## 2023-05-30 PROCEDURE — 76770 US EXAM ABDO BACK WALL COMP: CPT | Mod: TC

## 2023-06-05 ENCOUNTER — OFFICE VISIT (OUTPATIENT)
Dept: NEPHROLOGY | Facility: CLINIC | Age: 63
End: 2023-06-05
Payer: COMMERCIAL

## 2023-06-05 VITALS
OXYGEN SATURATION: 99 % | HEIGHT: 65 IN | SYSTOLIC BLOOD PRESSURE: 118 MMHG | HEART RATE: 70 BPM | BODY MASS INDEX: 31.52 KG/M2 | TEMPERATURE: 98 F | WEIGHT: 189.19 LBS | RESPIRATION RATE: 18 BRPM | DIASTOLIC BLOOD PRESSURE: 70 MMHG

## 2023-06-05 DIAGNOSIS — I12.9 HYPERTENSIVE NEPHROSCLEROSIS, STAGE 1 THROUGH STAGE 4 OR UNSPECIFIED CHRONIC KIDNEY DISEASE: ICD-10-CM

## 2023-06-05 DIAGNOSIS — N18.31 STAGE 3A CHRONIC KIDNEY DISEASE: Primary | ICD-10-CM

## 2023-06-05 DIAGNOSIS — I10 ESSENTIAL HYPERTENSION: ICD-10-CM

## 2023-06-05 PROCEDURE — 1159F MED LIST DOCD IN RCRD: CPT | Mod: CPTII,,, | Performed by: INTERNAL MEDICINE

## 2023-06-05 PROCEDURE — 99214 OFFICE O/P EST MOD 30 MIN: CPT | Mod: S$PBB,,, | Performed by: INTERNAL MEDICINE

## 2023-06-05 PROCEDURE — 3066F NEPHROPATHY DOC TX: CPT | Mod: CPTII,,, | Performed by: INTERNAL MEDICINE

## 2023-06-05 PROCEDURE — 99214 PR OFFICE/OUTPT VISIT, EST, LEVL IV, 30-39 MIN: ICD-10-PCS | Mod: S$PBB,,, | Performed by: INTERNAL MEDICINE

## 2023-06-05 PROCEDURE — 3074F SYST BP LT 130 MM HG: CPT | Mod: CPTII,,, | Performed by: INTERNAL MEDICINE

## 2023-06-05 PROCEDURE — 3066F PR DOCUMENTATION OF TREATMENT FOR NEPHROPATHY: ICD-10-PCS | Mod: CPTII,,, | Performed by: INTERNAL MEDICINE

## 2023-06-05 PROCEDURE — 99215 OFFICE O/P EST HI 40 MIN: CPT | Mod: PBBFAC | Performed by: INTERNAL MEDICINE

## 2023-06-05 PROCEDURE — 3078F PR MOST RECENT DIASTOLIC BLOOD PRESSURE < 80 MM HG: ICD-10-PCS | Mod: CPTII,,, | Performed by: INTERNAL MEDICINE

## 2023-06-05 PROCEDURE — 3074F PR MOST RECENT SYSTOLIC BLOOD PRESSURE < 130 MM HG: ICD-10-PCS | Mod: CPTII,,, | Performed by: INTERNAL MEDICINE

## 2023-06-05 PROCEDURE — 3008F PR BODY MASS INDEX (BMI) DOCUMENTED: ICD-10-PCS | Mod: CPTII,,, | Performed by: INTERNAL MEDICINE

## 2023-06-05 PROCEDURE — 3078F DIAST BP <80 MM HG: CPT | Mod: CPTII,,, | Performed by: INTERNAL MEDICINE

## 2023-06-05 PROCEDURE — 1159F PR MEDICATION LIST DOCUMENTED IN MEDICAL RECORD: ICD-10-PCS | Mod: CPTII,,, | Performed by: INTERNAL MEDICINE

## 2023-06-05 PROCEDURE — 3008F BODY MASS INDEX DOCD: CPT | Mod: CPTII,,, | Performed by: INTERNAL MEDICINE

## 2023-06-05 RX ORDER — LEVOTHYROXINE SODIUM 137 UG/1
TABLET ORAL
COMMUNITY
Start: 2023-05-28 | End: 2023-06-05 | Stop reason: DRUGHIGH

## 2023-06-05 RX ORDER — SIMVASTATIN 40 MG/1
40 TABLET, FILM COATED ORAL NIGHTLY
COMMUNITY
Start: 2023-04-28

## 2023-06-05 RX ORDER — METHYLPHENIDATE HYDROCHLORIDE 10 MG/1
TABLET ORAL
COMMUNITY
Start: 2023-05-15 | End: 2023-06-05 | Stop reason: SDUPTHER

## 2023-06-05 RX ORDER — PROMETHAZINE HYDROCHLORIDE AND DEXTROMETHORPHAN HYDROBROMIDE 6.25; 15 MG/5ML; MG/5ML
SYRUP ORAL
COMMUNITY
Start: 2023-02-09 | End: 2023-12-04

## 2023-06-05 RX ORDER — FLUTICASONE PROPIONATE 50 MCG
2 SPRAY, SUSPENSION (ML) NASAL
COMMUNITY
Start: 2023-04-28

## 2023-06-05 RX ORDER — CITALOPRAM 40 MG/1
TABLET, FILM COATED ORAL
COMMUNITY

## 2023-06-05 RX ORDER — METHYLPHENIDATE HYDROCHLORIDE 10 MG/1
1 TABLET ORAL 2 TIMES DAILY
COMMUNITY
Start: 2023-03-29 | End: 2023-12-04

## 2023-06-05 RX ORDER — FAMOTIDINE 20 MG/1
TABLET, FILM COATED ORAL
COMMUNITY
End: 2023-06-05 | Stop reason: SDUPTHER

## 2023-06-05 RX ORDER — OMEPRAZOLE 40 MG/1
CAPSULE, DELAYED RELEASE ORAL
COMMUNITY
End: 2023-06-05 | Stop reason: SDUPTHER

## 2023-06-05 RX ORDER — LEVOTHYROXINE SODIUM 137 UG/1
TABLET ORAL
COMMUNITY
Start: 2023-04-04 | End: 2023-06-05 | Stop reason: DRUGHIGH

## 2023-06-05 RX ORDER — LOSARTAN POTASSIUM AND HYDROCHLOROTHIAZIDE 25; 100 MG/1; MG/1
TABLET ORAL
COMMUNITY

## 2023-06-05 RX ORDER — MUPIROCIN 20 MG/G
OINTMENT TOPICAL
COMMUNITY
Start: 2023-05-04 | End: 2023-12-04

## 2023-06-05 RX ORDER — DEXTROAMPHETAMINE SACCHARATE, AMPHETAMINE ASPARTATE MONOHYDRATE, DEXTROAMPHETAMINE SULFATE AND AMPHETAMINE SULFATE 6.25; 6.25; 6.25; 6.25 MG/1; MG/1; MG/1; MG/1
CAPSULE, EXTENDED RELEASE ORAL
COMMUNITY
Start: 2023-03-29 | End: 2023-06-05 | Stop reason: SDUPTHER

## 2023-06-05 RX ORDER — BENZONATATE 100 MG/1
CAPSULE ORAL
COMMUNITY
Start: 2023-02-09 | End: 2023-06-05 | Stop reason: ALTCHOICE

## 2023-06-05 NOTE — PROGRESS NOTES
[facility] History and Physical  Patient Name: Tamera Hernandez  MRN: [id number]  Age: 62 y.o.  : 1960    Date: 2023 10:20 AM    Chief Complaint: Follow Up    HPI :   Ms Hernandez presents to Nephrology clinic for follow up. She is followed by KATHY Juarez as her primary care provider.     HTN: diagnosed about 4 years ago. Current regimen includes losartan-hydrochlorothiazide 100-25 mg daily. Well controlled.     DLD: on statin    Insomnia: on adderall     Arthritis,chronic back pain: off mobic     History of nephrolithiasis: history of lithotripsy. Been several years. No recent stones.  Unsure type.     Nephrology history:   Patient recently hospitalized 10/25-10/29 due to N/V/D associated with cholecystitis. Patient found to have pre-renal KHADIJAH during that time. Peak sCr 7.15 during her stay. She improved with IVF. She is now s/p cholecystectomy on 10/28 with Dr. Hernandez.  She was referred after hospitalization during which she had an KHADIJAH. sCr improved to 1.1 after her last visit. No FH of kidney disease, no recurrent UTIs. No OTC medications including NSAIDs. She was previously on mobic for years.      Patient denies any CP, SOB, peripheral edema, dysuria, hematuria, changes in urinary habits, or increased frequency of urination. She has stopped drinking sodas.     Past Medical History:  has no past medical history on file.     Past Surgical History:   has a past surgical history that includes Hysterectomy; Oophorectomy; Laparoscopic cholecystectomy (N/A, 10/27/2022); and Tonsillectomy and adenoidectomy.     Family History:  family history is not on file. No family history of kidney disease.     Social History:   reports that she has been smoking vaping with nicotine. She has never used smokeless tobacco. She reports that she does not drink alcohol and does not use drugs. 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     Allergies: has No Known Allergies.     Medications: Reviewed including OTC medications, herbal supplements, and NSAIDS.     Old records have been reviewed.      Review of Systems:  ROS: A 10 point ROS was completed and found to be negative except for that mentioned above.          Physical Exam:  Vitals:    06/05/23 1325   BP: 118/70   Pulse: 70   Resp: 18   Temp: 98.3 °F (36.8 °C)         Constitutional: sitting in chair, in NAD  Eyes: EOMI, white sclera  ENMT: moist mucus membranes, nares patent  Cardiovascular: normal rate, S1/S2 noted, no edema  Respiratory: symmetrical chest expansion, CTA-B  Gastrointestinal: +BS, soft, NT/ND  Musculoskeletal: normal, no joint erythema/effusions  Skin: no rash, no purpura, warm extremities  Neurological: Alert and Oriented x 4, afocal    Labs:   Lab Results   Component Value Date     11/28/2022    K 4.3 11/28/2022    CREATININE 1.17 (H) 11/28/2022    ALT 31 10/25/2022    AST 22 10/25/2022    TSH 1.570 10/25/2022    WBC 7.99 11/28/2022    HGB 12.2 11/28/2022    HCT 38.0 11/28/2022     11/28/2022        Otherwise Reviewed    Assessment/Plan:       CKD stage IIIa with recent KHADIJAH, in setting of pre-renal losses now resolved. Baseline sCr TBD. Repeat labs today pending. Counseled to avoid nephrotoxic agents such as NSAIDs.     Proteinuria: urine Prot:Creat ratio is pending . Patient is on RAAS blockade with ARB    Anemia: cbc today    BMD: Calcium and Phosphorus PTH Vit D today    HTN: well controlled with current meds     RTC 6 months with CBC, RFP, UA, urine for Prot:creat ratio, PTH, Vit D      DO Victor [facility]   06/05/2023

## 2023-12-05 ENCOUNTER — OFFICE VISIT (OUTPATIENT)
Dept: NEPHROLOGY | Facility: CLINIC | Age: 63
End: 2023-12-05
Payer: COMMERCIAL

## 2023-12-05 VITALS
HEIGHT: 65 IN | RESPIRATION RATE: 18 BRPM | OXYGEN SATURATION: 98 % | BODY MASS INDEX: 33.02 KG/M2 | HEART RATE: 77 BPM | WEIGHT: 198.19 LBS | DIASTOLIC BLOOD PRESSURE: 82 MMHG | SYSTOLIC BLOOD PRESSURE: 122 MMHG

## 2023-12-05 DIAGNOSIS — I12.9 HYPERTENSIVE NEPHROSCLEROSIS, STAGE 1 THROUGH STAGE 4 OR UNSPECIFIED CHRONIC KIDNEY DISEASE: ICD-10-CM

## 2023-12-05 DIAGNOSIS — I10 ESSENTIAL HYPERTENSION: ICD-10-CM

## 2023-12-05 DIAGNOSIS — N18.32 STAGE 3B CHRONIC KIDNEY DISEASE: Primary | ICD-10-CM

## 2023-12-05 DIAGNOSIS — K21.9 GASTROESOPHAGEAL REFLUX DISEASE WITHOUT ESOPHAGITIS: ICD-10-CM

## 2023-12-05 PROCEDURE — 3066F PR DOCUMENTATION OF TREATMENT FOR NEPHROPATHY: ICD-10-PCS | Mod: CPTII,,, | Performed by: INTERNAL MEDICINE

## 2023-12-05 PROCEDURE — 3061F PR NEG MICROALBUMINURIA RESULT DOCUMENTED/REVIEW: ICD-10-PCS | Mod: CPTII,,, | Performed by: INTERNAL MEDICINE

## 2023-12-05 PROCEDURE — 99215 OFFICE O/P EST HI 40 MIN: CPT | Mod: PBBFAC | Performed by: INTERNAL MEDICINE

## 2023-12-05 PROCEDURE — 3074F SYST BP LT 130 MM HG: CPT | Mod: CPTII,,, | Performed by: INTERNAL MEDICINE

## 2023-12-05 PROCEDURE — 3008F BODY MASS INDEX DOCD: CPT | Mod: CPTII,,, | Performed by: INTERNAL MEDICINE

## 2023-12-05 PROCEDURE — 3008F PR BODY MASS INDEX (BMI) DOCUMENTED: ICD-10-PCS | Mod: CPTII,,, | Performed by: INTERNAL MEDICINE

## 2023-12-05 PROCEDURE — 3061F NEG MICROALBUMINURIA REV: CPT | Mod: CPTII,,, | Performed by: INTERNAL MEDICINE

## 2023-12-05 PROCEDURE — 3079F PR MOST RECENT DIASTOLIC BLOOD PRESSURE 80-89 MM HG: ICD-10-PCS | Mod: CPTII,,, | Performed by: INTERNAL MEDICINE

## 2023-12-05 PROCEDURE — 1159F MED LIST DOCD IN RCRD: CPT | Mod: CPTII,,, | Performed by: INTERNAL MEDICINE

## 2023-12-05 PROCEDURE — 99214 OFFICE O/P EST MOD 30 MIN: CPT | Mod: S$PBB,,, | Performed by: INTERNAL MEDICINE

## 2023-12-05 PROCEDURE — 99214 PR OFFICE/OUTPT VISIT, EST, LEVL IV, 30-39 MIN: ICD-10-PCS | Mod: S$PBB,,, | Performed by: INTERNAL MEDICINE

## 2023-12-05 PROCEDURE — 3074F PR MOST RECENT SYSTOLIC BLOOD PRESSURE < 130 MM HG: ICD-10-PCS | Mod: CPTII,,, | Performed by: INTERNAL MEDICINE

## 2023-12-05 PROCEDURE — 3079F DIAST BP 80-89 MM HG: CPT | Mod: CPTII,,, | Performed by: INTERNAL MEDICINE

## 2023-12-05 PROCEDURE — 3066F NEPHROPATHY DOC TX: CPT | Mod: CPTII,,, | Performed by: INTERNAL MEDICINE

## 2023-12-05 PROCEDURE — 1159F PR MEDICATION LIST DOCUMENTED IN MEDICAL RECORD: ICD-10-PCS | Mod: CPTII,,, | Performed by: INTERNAL MEDICINE

## 2023-12-05 RX ORDER — LISINOPRIL AND HYDROCHLOROTHIAZIDE 10; 12.5 MG/1; MG/1
TABLET ORAL
COMMUNITY
End: 2023-12-05 | Stop reason: DRUGHIGH

## 2023-12-05 RX ORDER — MELOXICAM 15 MG/1
TABLET ORAL
COMMUNITY
End: 2023-12-05

## 2023-12-05 RX ORDER — LEVOTHYROXINE SODIUM 137 UG/1
137 TABLET ORAL EVERY MORNING
COMMUNITY
Start: 2023-11-24

## 2023-12-05 RX ORDER — METHYLPHENIDATE HYDROCHLORIDE 36 MG/1
36 TABLET ORAL EVERY MORNING
COMMUNITY
Start: 2023-11-24

## 2023-12-05 NOTE — PROGRESS NOTES
[facility] History and Physical  Patient Name: Tamera Hernandez  MRN: [id number]  Age: 63 y.o.  : 1960    Date: 2023 10:20 AM    Chief Complaint: Follow Up    HPI :   Ms Hernandez presents to Nephrology clinic for follow up. She is followed by KATHY Juarez as her primary care provider.     HTN: diagnosed about 4 years ago. Current regimen includes losartan-hydrochlorothiazide 100-25 mg daily. Well controlled.     DLD: on statin    Insomnia: on adderall     Arthritis,chronic back pain: off mobic     History of nephrolithiasis: history of lithotripsy. Been several years. No recent stones.  Unsure type.     Nephrology history:   Patient recently hospitalized 10/25-10/29 due to N/V/D associated with cholecystitis. Patient found to have pre-renal KHADIJAH during that time. Peak sCr 7.15 during her stay. She improved with IVF. She is now s/p cholecystectomy on 10/28 with Dr. Hernandez.  She was referred after hospitalization during which she had an KHADIJAH. sCr improved to 1.1 after her last visit. No FH of kidney disease, no recurrent UTIs. No OTC medications including NSAIDs. She was previously on mobic for years.      Patient denies any CP, SOB, peripheral edema, dysuria, hematuria, changes in urinary habits, or increased frequency of urination. She has stopped drinking sodas. She inquires about her PPI and if she should stop these medications.     Past Medical History:  has no past medical history on file.     Past Surgical History:   has a past surgical history that includes Hysterectomy; Oophorectomy; Laparoscopic cholecystectomy (N/A, 10/27/2022); and Tonsillectomy and adenoidectomy.     Family History:  family history is not on file. No family history of kidney disease.     Social History:   reports that she has been smoking vaping with nicotine. She has never used smokeless tobacco. She reports that she does not drink alcohol and does not use drugs.  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     Allergies: has No Known Allergies.     Medications: Reviewed including OTC medications, herbal supplements, and NSAIDS.     Old records have been reviewed.      Review of Systems:  ROS: A 10 point ROS was completed and found to be negative except for that mentioned above.          Physical Exam:  Vitals:    12/05/23 1306   BP: 122/82   Pulse: 77   Resp: 18         Constitutional: sitting in chair, in NAD  Eyes: EOMI, white sclera  ENMT: moist mucus membranes, nares patent  Cardiovascular: normal rate, S1/S2 noted, no edema  Respiratory: symmetrical chest expansion, CTA-B  Gastrointestinal: +BS, soft, NT/ND  Musculoskeletal: normal, no joint erythema/effusions  Skin: no rash, no purpura, warm extremities  Neurological: Alert and Oriented x 4, afocal    Labs:   Lab Results   Component Value Date     06/05/2023    K 4.2 06/05/2023    CREATININE 1.37 (H) 06/05/2023    ALT 31 10/25/2022    AST 22 10/25/2022    TSH 1.570 10/25/2022    WBC 5.98 06/05/2023    HGB 12.7 06/05/2023    HCT 41.1 06/05/2023     06/05/2023        Otherwise Reviewed    Assessment/Plan:       CKD stage III with recent KHADIJAH, in setting of pre-renal losses now resolved. Baseline sCr TBD. Repeat labs today pending. Counseled to avoid nephrotoxic agents such as NSAIDs.     GERD: on Pepcid and omeprazole. Refer to  GI. Due for screening colonoscopy as well.     Proteinuria: urine Prot:Creat ratio is pending . Patient is on RAAS blockade with ARB    Anemia: CBC pending    BMD: Calcium and Phosphorus PTH Vit D today    HTN: well controlled with current meds     RTC 6 months with CBC, RFP, UA, urine for Prot:creat ratio, PTH, Vit D      Alisha S. Parker, DO [facility]   12/05/2023

## 2024-01-31 ENCOUNTER — HOSPITAL ENCOUNTER (OUTPATIENT)
Dept: RADIOLOGY | Facility: HOSPITAL | Age: 64
Discharge: HOME | End: 2024-01-31
Attending: NURSE PRACTITIONER
Payer: COMMERCIAL

## 2024-01-31 ENCOUNTER — OFFICE VISIT (OUTPATIENT)
Dept: GASTROENTEROLOGY | Facility: CLINIC | Age: 64
End: 2024-01-31
Payer: COMMERCIAL

## 2024-01-31 VITALS — BODY MASS INDEX: 31.99 KG/M2 | WEIGHT: 192 LBS | HEIGHT: 65 IN

## 2024-01-31 VITALS
DIASTOLIC BLOOD PRESSURE: 50 MMHG | BODY MASS INDEX: 33.82 KG/M2 | HEIGHT: 65 IN | HEART RATE: 71 BPM | OXYGEN SATURATION: 97 % | WEIGHT: 203 LBS | SYSTOLIC BLOOD PRESSURE: 104 MMHG

## 2024-01-31 DIAGNOSIS — K21.9 GASTROESOPHAGEAL REFLUX DISEASE WITHOUT ESOPHAGITIS: ICD-10-CM

## 2024-01-31 DIAGNOSIS — Z12.31 BREAST CANCER SCREENING BY MAMMOGRAM: ICD-10-CM

## 2024-01-31 DIAGNOSIS — R13.10 DYSPHAGIA, UNSPECIFIED TYPE: Primary | ICD-10-CM

## 2024-01-31 DIAGNOSIS — Z12.11 SCREENING FOR COLON CANCER: ICD-10-CM

## 2024-01-31 PROCEDURE — 99203 OFFICE O/P NEW LOW 30 MIN: CPT | Mod: S$PBB,,,

## 2024-01-31 PROCEDURE — 1160F RVW MEDS BY RX/DR IN RCRD: CPT | Mod: CPTII,,,

## 2024-01-31 PROCEDURE — 3078F DIAST BP <80 MM HG: CPT | Mod: CPTII,,,

## 2024-01-31 PROCEDURE — 77067 SCR MAMMO BI INCL CAD: CPT | Mod: TC

## 2024-01-31 PROCEDURE — 3074F SYST BP LT 130 MM HG: CPT | Mod: CPTII,,,

## 2024-01-31 PROCEDURE — 3008F BODY MASS INDEX DOCD: CPT | Mod: CPTII,,,

## 2024-01-31 PROCEDURE — 99215 OFFICE O/P EST HI 40 MIN: CPT | Mod: PBBFAC

## 2024-01-31 PROCEDURE — 1159F MED LIST DOCD IN RCRD: CPT | Mod: CPTII,,,

## 2024-01-31 RX ORDER — POLYETHYLENE GLYCOL 3350, SODIUM SULFATE ANHYDROUS, SODIUM BICARBONATE, SODIUM CHLORIDE, POTASSIUM CHLORIDE 236; 22.74; 6.74; 5.86; 2.97 G/4L; G/4L; G/4L; G/4L; G/4L
4 POWDER, FOR SOLUTION ORAL ONCE
Qty: 4000 ML | Refills: 0 | Status: SHIPPED | OUTPATIENT
Start: 2024-01-31 | End: 2024-01-31

## 2024-01-31 NOTE — PROGRESS NOTES
Gastroenterology Clinic Note    Patient ID: [id number]   Referring MD: Dee Dee Marroquin DO   Chief Complaint:   Chief Complaint   Patient presents with    Gastroesophageal Reflux    Gas    Bloated    Colonoscopy       History of Present Illness   Tamera Hernandez is an 63 y.o. female who is referred for GERD. Patient complains of increased symptoms of acid reflux, abdominal bloating, and constipation. She has been using stool softener without relief. She is taking Prilosec 40 mg daily and Pepcid 20 mg BID. She also reports dysphagia with solid foods. She denies any hematochezia or melena. She is adopted so full FMH is unknown.        She is due for screening colonoscopy.    Review of Systems   Constitutional:  Negative for weight loss.   Gastrointestinal:  Positive for abdominal pain, constipation, heartburn and nausea. Negative for blood in stool, diarrhea, melena and vomiting.       Past Medical History    History reviewed. No pertinent past medical history.    Past Surgical History     Past Surgical History:   Procedure Laterality Date    HYSTERECTOMY      LAPAROSCOPIC CHOLECYSTECTOMY N/A 10/27/2022    Procedure: CHOLECYSTECTOMY, LAPAROSCOPIC;  Surgeon: Maxime Hernandez DO;  Location: [facility];  Service: General;  Laterality: N/A;    OOPHORECTOMY      TONSILLECTOMY AND ADENOIDECTOMY         Allergies   Review of patient's allergies indicates:  No Known Allergies    Immunization History     There is no immunization history on file for this patient.    Past Family History      Family History   Adopted: Yes       Past Social History      Social History     Socioeconomic History    Marital status:    Tobacco Use    Smoking status: Former     Current packs/day: 1.00     Average packs/day: 1 pack/day for 40.0 years (40.0 ttl pk-yrs)     Types: Vaping with nicotine, Cigarettes    Smokeless tobacco: Never    Tobacco comments:     Currently vapes with nicotine   Substance and Sexual Activity    Alcohol use: Never  "   Drug use: Never    Sexual activity: Not Currently       Current Medications     Outpatient Medications Marked as Taking for the 1/31/24 encounter (Office Visit) with Jenny Ceja FNP   Medication Sig Dispense Refill    ascorbic acid, vitamin C, (VITAMIN C) 1000 MG tablet Take 1,000 mg by mouth once daily.      aspirin (ECOTRIN) 81 MG EC tablet Take 81 mg by mouth once daily.      b complex vitamins capsule Take 1 capsule by mouth once daily.      calcium carbonate (OS-TAB) 600 mg calcium (1,500 mg) Tab Take 600 mg by mouth once.      citalopram (CELEXA) 40 MG tablet 1 tablet Orally Once a day      estradioL (ESTRACE) 1 MG tablet Take 1 mg by mouth once daily.      famotidine (PEPCID) 20 MG tablet Take 20 mg by mouth 2 (two) times daily.      fluticasone propionate (FLONASE) 50 mcg/actuation nasal spray 2 sprays by Each Nostril route.      levothyroxine (SYNTHROID) 137 MCG Tab tablet Take 137 mcg by mouth every morning. take on an empty stomach      losartan-hydrochlorothiazide 100-25 mg (HYZAAR) 100-25 mg per tablet 1 tablet Orally Once a day      methylphenidate HCl 36 MG CR tablet Take 36 mg by mouth every morning.      montelukast (SINGULAIR) 10 mg tablet TAKE 1 TABLET BY MOUTH ONCE DAILY IN THE EVENING FOR 90 DAYS      omeprazole (PRILOSEC) 40 MG capsule Take 40 mg by mouth once daily.      OXcarbazepine (TRILEPTAL) 300 MG Tab Take 150 mg by mouth 2 (two) times daily.      OXYGEN-AIR DELIVERY SYSTEMS MISC as directed      simvastatin (ZOCOR) 40 MG tablet Take 40 mg by mouth every evening.          I have reviewed the current medications, allergies, vital signs, past medical and surgical history, family medical history, and social history for this encounter and agree with all findings.    OBJECTIVE    Physical Exam    BP (!) 104/50   Pulse 71   Ht 5' 5" (1.651 m)   Wt 92.1 kg (203 lb)   SpO2 97%   BMI 33.78 kg/m²   GEN: Well appearing, cooperative, NAD  NECK: Supple, no LAD  CV: Normal rate  RESP: " Unlabored  ABD: ND, no guarding  EXT: No clubbing, cyanosis, or edema  SKIN: Warm and dry  NEURO: AAO x4.     LABS    CBC (with or without Differential):   Lab Results   Component Value Date    WBC 6.49 12/05/2023    HGB 13.2 12/05/2023    HCT 40.5 12/05/2023    MCV 91.0 12/05/2023    MCH 29.7 12/05/2023    MCHC 32.6 12/05/2023    RDW 13.1 12/05/2023     12/05/2023    MPV 9.8 12/05/2023    NEUTOPHILPCT 54.8 12/05/2023    DIFFTYPE Auto 12/05/2023     BMP/CMP:   Lab Results   Component Value Date     12/05/2023    K 4.4 12/05/2023     (H) 12/05/2023    CO2 31 12/05/2023    BUN 32 (H) 12/05/2023    CREATININE 1.36 (H) 12/05/2023     12/05/2023    CALCIUM 8.4 (L) 12/05/2023    ALBUMIN 3.3 (L) 12/05/2023    AST 22 10/25/2022    ALT 31 10/25/2022    ALKPHOS 131 (H) 10/25/2022    MG 1.7 10/29/2022        IMAGING  No pertinent imaging.     ASSESSMENT  Tamera Hernandez is a 63 y.o. WF with PMH of HTN, CKD, and GERD who is referred for GERD.    1. Dysphagia, unspecified type    2. Gastroesophageal reflux disease without esophagitis    3. Screening for colon cancer           PLAN    - Schedule EGD with possible dilation for dypshagia  - Schedule colonoscopy for CRC screening   Patient Instructions   - I recommend a bowel cleanse with a gatorade miralax prep: take 20 mg of dulcolax orally and then mix 238 grams of miralax in 64 oz of your favorite zero sugar gatorade and drink over a 4 hour period on a day when you will be at home  - After your bowel cleanse, I recommend starting a daily fiber supplement with Citrucel or Fibercon (can purchase at your local pharmacy)  - I recommend that you also use Miralax 17 grams daily-to-twice daily as needed to have a regular, soft BM without straining - you can adjust how often you need miralax, but start with daily dosing and go from there  - I recommend drinking at least 60-80 ounces of water daily unless you have a medical condition that requires fluid  restriction        No orders of the defined types were placed in this encounter.        The risks and benefits of my recommendations, as well as other treatment options were discussed with the patient today. All questions were answered.    40 minutes of total time spent on the encounter, which includes face to face time and non-face to face time preparing to see the patient (eg, review of tests), obtaining and/or reviewing separately obtained history, documenting clinical information in the electronic or other health record, Independently interpreting results (not separately reported) and communicating results to the patient/family/caregiver, or care coordination (not separately reported).        Jenny Ceja, FNP/ACNP  [facility]

## 2024-01-31 NOTE — PATIENT INSTRUCTIONS
- I recommend a bowel cleanse with a gatorade miralax prep: take 20 mg of dulcolax orally and then mix 238 grams of miralax in 64 oz of your favorite zero sugar gatorade and drink over a 4 hour period on a day when you will be at home  - After your bowel cleanse, I recommend starting a daily fiber supplement with Citrucel or Fibercon (can purchase at your local pharmacy)  - I recommend that you also use Miralax 17 grams daily-to-twice daily as needed to have a regular, soft BM without straining - you can adjust how often you need miralax, but start with daily dosing and go from there  - I recommend drinking at least 60-80 ounces of water daily unless you have a medical condition that requires fluid restriction

## 2024-02-07 ENCOUNTER — ANESTHESIA EVENT (OUTPATIENT)
Dept: GASTROENTEROLOGY | Facility: HOSPITAL | Age: 64
End: 2024-02-07
Payer: COMMERCIAL

## 2024-02-07 ENCOUNTER — ANESTHESIA (OUTPATIENT)
Dept: GASTROENTEROLOGY | Facility: HOSPITAL | Age: 64
End: 2024-02-07
Payer: COMMERCIAL

## 2024-02-07 ENCOUNTER — HOSPITAL ENCOUNTER (OUTPATIENT)
Dept: GASTROENTEROLOGY | Facility: HOSPITAL | Age: 64
Discharge: HOME | End: 2024-02-07
Payer: COMMERCIAL

## 2024-02-07 VITALS
DIASTOLIC BLOOD PRESSURE: 25 MMHG | OXYGEN SATURATION: 98 % | BODY MASS INDEX: 33.82 KG/M2 | HEIGHT: 65 IN | TEMPERATURE: 98 F | RESPIRATION RATE: 13 BRPM | WEIGHT: 203 LBS | HEART RATE: 59 BPM | SYSTOLIC BLOOD PRESSURE: 139 MMHG

## 2024-02-07 DIAGNOSIS — R13.10 DYSPHAGIA, UNSPECIFIED TYPE: ICD-10-CM

## 2024-02-07 DIAGNOSIS — K21.9 GASTROESOPHAGEAL REFLUX DISEASE WITHOUT ESOPHAGITIS: Primary | ICD-10-CM

## 2024-02-07 PROCEDURE — 43239 EGD BIOPSY SINGLE/MULTIPLE: CPT | Performed by: INTERNAL MEDICINE

## 2024-02-07 PROCEDURE — 63600175 PHARM REV CODE 636 W HCPCS: Performed by: NURSE ANESTHETIST, CERTIFIED REGISTERED

## 2024-02-07 PROCEDURE — 37000009 HC ANESTHESIA EA ADD 15 MINS

## 2024-02-07 PROCEDURE — D9220A PRA ANESTHESIA: Mod: ,,, | Performed by: NURSE ANESTHETIST, CERTIFIED REGISTERED

## 2024-02-07 PROCEDURE — 37000008 HC ANESTHESIA 1ST 15 MINUTES

## 2024-02-07 PROCEDURE — 88305 TISSUE EXAM BY PATHOLOGIST: CPT | Mod: 26,,, | Performed by: PATHOLOGY

## 2024-02-07 PROCEDURE — 88305 TISSUE EXAM BY PATHOLOGIST: CPT | Mod: TC,SUR | Performed by: INTERNAL MEDICINE

## 2024-02-07 PROCEDURE — 43239 EGD BIOPSY SINGLE/MULTIPLE: CPT | Mod: ,,, | Performed by: INTERNAL MEDICINE

## 2024-02-07 PROCEDURE — 27201423 OPTIME MED/SURG SUP & DEVICES STERILE SUPPLY

## 2024-02-07 PROCEDURE — 88342 IMHCHEM/IMCYTCHM 1ST ANTB: CPT | Mod: 26,,, | Performed by: PATHOLOGY

## 2024-02-07 PROCEDURE — 25000003 PHARM REV CODE 250: Performed by: NURSE ANESTHETIST, CERTIFIED REGISTERED

## 2024-02-07 RX ORDER — LIDOCAINE HYDROCHLORIDE 20 MG/ML
INJECTION, SOLUTION EPIDURAL; INFILTRATION; INTRACAUDAL; PERINEURAL
Status: DISCONTINUED | OUTPATIENT
Start: 2024-02-07 | End: 2024-02-07

## 2024-02-07 RX ORDER — SODIUM CHLORIDE 0.9 % (FLUSH) 0.9 %
10 SYRINGE (ML) INJECTION
Status: DISCONTINUED | OUTPATIENT
Start: 2024-02-07 | End: 2024-02-08 | Stop reason: HOSPADM

## 2024-02-07 RX ORDER — SODIUM CHLORIDE 0.9 % (FLUSH) 0.9 %
10 SYRINGE (ML) INJECTION
OUTPATIENT
Start: 2024-02-07

## 2024-02-07 RX ORDER — PROPOFOL 10 MG/ML
VIAL (ML) INTRAVENOUS
Status: DISCONTINUED | OUTPATIENT
Start: 2024-02-07 | End: 2024-02-07

## 2024-02-07 RX ADMIN — PROPOFOL 100 MG: 10 INJECTION, EMULSION INTRAVENOUS at 01:02

## 2024-02-07 RX ADMIN — SODIUM CHLORIDE: 9 INJECTION, SOLUTION INTRAVENOUS at 01:02

## 2024-02-07 RX ADMIN — LIDOCAINE HYDROCHLORIDE 8 MG: 20 INJECTION, SOLUTION INTRAVENOUS at 01:02

## 2024-02-07 NOTE — H&P
Gastroenterology Pre-procedure H&P    Chief Complaint: Gastroesophageal reflux disease without esophagitis    History of Present Illness    Tamera Hernandez is a 63 y.o. female that  has no past medical history on file.     Patient denies wt loss, N/V, dysphagia, abdominal pain, diarrhea, melena, no anticoagulants or FMHx of GI related malignancies.      History reviewed. No pertinent past medical history.    Past Surgical History:   Procedure Laterality Date    HYSTERECTOMY      LAPAROSCOPIC CHOLECYSTECTOMY N/A 10/27/2022    Procedure: CHOLECYSTECTOMY, LAPAROSCOPIC;  Surgeon: Maxime Hernandez DO;  Location: [facility];  Service: General;  Laterality: N/A;    OOPHORECTOMY      TONSILLECTOMY AND ADENOIDECTOMY         Family History   Adopted: Yes       Social History     Socioeconomic History    Marital status:    Tobacco Use    Smoking status: Former     Current packs/day: 1.00     Average packs/day: 1 pack/day for 40.0 years (40.0 ttl pk-yrs)     Types: Vaping with nicotine, Cigarettes    Smokeless tobacco: Never    Tobacco comments:     Currently vapes with nicotine   Substance and Sexual Activity    Alcohol use: Never    Drug use: Never    Sexual activity: Not Currently       Current Outpatient Medications   Medication Sig Dispense Refill    ascorbic acid, vitamin C, (VITAMIN C) 1000 MG tablet Take 1,000 mg by mouth once daily.      aspirin (ECOTRIN) 81 MG EC tablet Take 81 mg by mouth once daily.      b complex vitamins capsule Take 1 capsule by mouth once daily.      calcium carbonate (OS-TAB) 600 mg calcium (1,500 mg) Tab Take 600 mg by mouth once.      citalopram (CELEXA) 40 MG tablet 1 tablet Orally Once a day      dextroamphetamine-amphetamine (ADDERALL XR) 25 MG 24 hr capsule Take by mouth every morning.      estradioL (ESTRACE) 1 MG tablet Take 1 mg by mouth once daily.      famotidine (PEPCID) 20 MG tablet Take 20 mg by mouth 2 (two) times daily.      fluticasone propionate (FLONASE) 50  "mcg/actuation nasal spray 2 sprays by Each Nostril route.      levothyroxine (SYNTHROID) 137 MCG Tab tablet Take 137 mcg by mouth every morning. take on an empty stomach      losartan-hydrochlorothiazide 100-25 mg (HYZAAR) 100-25 mg per tablet 1 tablet Orally Once a day      methylphenidate HCl 36 MG CR tablet Take 36 mg by mouth every morning.      montelukast (SINGULAIR) 10 mg tablet TAKE 1 TABLET BY MOUTH ONCE DAILY IN THE EVENING FOR 90 DAYS      omeprazole (PRILOSEC) 40 MG capsule Take 40 mg by mouth once daily.      OXcarbazepine (TRILEPTAL) 300 MG Tab Take 150 mg by mouth 2 (two) times daily.      OXYGEN-AIR DELIVERY SYSTEMS MISC as directed      simvastatin (ZOCOR) 40 MG tablet Take 40 mg by mouth every evening.       No current facility-administered medications for this encounter.       Review of patient's allergies indicates:  No Known Allergies    Objective:  Vitals:    02/07/24 1158   BP: (!) 109/56   Pulse: 60   Resp: 13   SpO2: 98%   Weight: 92.1 kg (203 lb)   Height: 5' 5" (1.651 m)        GEN: normal appearing, NAD, AAO x3  HENT: NCAT, anicteric, OP benign  CV: normal rate, regular rhythm  RESP: CTA, symmetric rise, unlabored  ABD: soft, ND, no guarding or TTP  SKIN: warm and dry  NEURO: grossly afocal    Assessment and Plan:    Proceed with:    EGD for GERD, bloating      Rayshawn Marroquin MD  Gastroenterology  "

## 2024-02-07 NOTE — DISCHARGE INSTRUCTIONS
Procedure Date  2/7/24     Impression  Overall Impression:   Small type I hiatal hernia  Grade A esophagitis in the GE junction; performed cold forceps biopsy  Mild abnormal mucosa, consistent with gastritis in the incisura and antrum; performed cold forceps biopsies  The upper third of the esophagus, middle third of the esophagus, lower third of the esophagus, cardia, fundus of the stomach, body of the stomach, duodenal bulb, 1st part of the duodenum and 2nd part of the duodenum appeared normal.        Recommendation    Await pathology results  Continue current medications  Avoid NSAIDs  Follow up in GI clinic as scheduled       Outcome of procedure: successful EGD  Disposition: patient to recovery following procedure; discharge to home when appropriate parameters met  Provisions for follow up: please call my office for any unexpected symptoms like chest or abdominal pain or bleeding following your procedure.  Final Diagnosis: gastritis      Indication  64 yo WF with GERD with intermittent breakthrough symptoms on PPI/H2RA here for EGD. She denies dysphagia today.     THE NURSE WILL CALL YOU WITH YOUR BIOPSY RESULTS IN A FEW DAYS.    NO DRIVING, OPERATING EQUIPMENT, OR SIGNING LEGAL DOCUMENTS FOR 24 HOURS.      regular rate and rhythm/no rub/no murmur

## 2024-02-07 NOTE — ANESTHESIA POSTPROCEDURE EVALUATION
Anesthesia Post Evaluation    Patient: Tamera Hernandez    Procedure(s) Performed: * No procedures listed *    Final Anesthesia Type: general      Patient location during evaluation: GI PACU ([facility])  Patient participation: Yes- Able to Participate  Level of consciousness: awake and alert  Post-procedure vital signs: reviewed and stable  Pain management: adequate  Airway patency: patent    PONV status at discharge: No PONV  Anesthetic complications: no      Cardiovascular status: blood pressure returned to baseline, hemodynamically stable and stable  Respiratory status: unassisted  Hydration status: euvolemic  Follow-up not needed.  Comments: Refer to nursing note for pain/akilah score upon discharge from recovery.               Vitals Value Taken Time   /25 02/07/24 1351   Temp 36.6 °C (97.8 °F) 02/07/24 1329   Pulse 58 02/07/24 1353   Resp 14 02/07/24 1353   SpO2 99 % 02/07/24 1353   Vitals shown include unvalidated device data.      Event Time   Out of Recovery 14:01:17         Pain/Akilah Score: Akilah Score: 10 (2/7/2024  1:31 PM)

## 2024-02-07 NOTE — TRANSFER OF CARE
"Anesthesia Transfer of Care Note    Patient: Tamera Hernandez    Procedure(s) Performed: * No procedures listed *    Patient location: GI    Anesthesia Type: general    Transport from OR: Transported from OR on room air with adequate spontaneous ventilation    Post pain: adequate analgesia    Post assessment: no apparent anesthetic complications    Post vital signs: stable    Level of consciousness: sedated and responds to stimulation    Nausea/Vomiting: no nausea/vomiting    Complications: none    Transfer of care protocol was followedComments: Good SV continue, NAD noted, VSS, RTRN      Last vitals: Visit Vitals  BP (!) 110/57   Pulse 68   Temp 36.6 °C (97.8 °F)   Resp 10   Ht 5' 5" (1.651 m)   Wt 92.1 kg (203 lb)   SpO2 98%   Breastfeeding No   BMI 33.78 kg/m²     "

## 2024-02-07 NOTE — ANESTHESIA PREPROCEDURE EVALUATION
02/07/2024  Tamera Hernandez is a 63 y.o., female.      Pre-op Assessment    I have reviewed the Patient Summary Reports.     I have reviewed the Nursing Notes. I have reviewed the NPO Status.   I have reviewed the Medications.     Review of Systems  Anesthesia Hx:   History of prior surgery of interest to airway management or planning:          Denies Family Hx of Anesthesia complications.    Denies Personal Hx of Anesthesia complications.                    Cardiovascular:     Hypertension                                  Hypertension         Renal/:  Chronic Renal Disease        Kidney Function/Disease             Hepatic/GI:     GERD      Gerd            Active Ambulatory Problems     Diagnosis Date Noted    Right hip pain 08/18/2021    Greater trochanteric bursitis, right 08/18/2021    Iliotibial band tendonitis of right side 08/18/2021    Obesity (BMI 30-39.9) 10/25/2022    Stage 3a chronic kidney disease 06/05/2023    Essential hypertension 06/05/2023    Hypertensive nephrosclerosis 06/05/2023     Resolved Ambulatory Problems     Diagnosis Date Noted    Sacroiliac joint dysfunction of right side 08/18/2021    Cholelithiasis with acute cholecystitis 10/25/2022    Acute renal failure 10/25/2022    Hyponatremia 10/25/2022    Diarrhea 10/25/2022    Nausea & vomiting 10/25/2022     No Additional Past Medical History    Review of patient's allergies indicates:  No Known Allergies   Current Outpatient Medications on File Prior to Encounter   Medication Sig Dispense Refill    ascorbic acid, vitamin C, (VITAMIN C) 1000 MG tablet Take 1,000 mg by mouth once daily.      aspirin (ECOTRIN) 81 MG EC tablet Take 81 mg by mouth once daily.      b complex vitamins capsule Take 1 capsule by mouth once daily.      calcium carbonate (OS-TAB) 600 mg calcium (1,500 mg) Tab Take 600 mg by mouth once.      citalopram  (CELEXA) 40 MG tablet 1 tablet Orally Once a day      dextroamphetamine-amphetamine (ADDERALL XR) 25 MG 24 hr capsule Take by mouth every morning.      estradioL (ESTRACE) 1 MG tablet Take 1 mg by mouth once daily.      famotidine (PEPCID) 20 MG tablet Take 20 mg by mouth 2 (two) times daily.      fluticasone propionate (FLONASE) 50 mcg/actuation nasal spray 2 sprays by Each Nostril route.      levothyroxine (SYNTHROID) 137 MCG Tab tablet Take 137 mcg by mouth every morning. take on an empty stomach      losartan-hydrochlorothiazide 100-25 mg (HYZAAR) 100-25 mg per tablet 1 tablet Orally Once a day      methylphenidate HCl 36 MG CR tablet Take 36 mg by mouth every morning.      montelukast (SINGULAIR) 10 mg tablet TAKE 1 TABLET BY MOUTH ONCE DAILY IN THE EVENING FOR 90 DAYS      omeprazole (PRILOSEC) 40 MG capsule Take 40 mg by mouth once daily.      OXcarbazepine (TRILEPTAL) 300 MG Tab Take 150 mg by mouth 2 (two) times daily.      OXYGEN-AIR DELIVERY SYSTEMS MISC as directed      simvastatin (ZOCOR) 40 MG tablet Take 40 mg by mouth every evening.       No current facility-administered medications on file prior to encounter.        Physical Exam  General: Well nourished    Airway:  Mallampati: II   Mouth Opening: Normal  TM Distance: Normal, at least 6 cm  Tongue: Normal  Neck ROM: Normal ROM        Anesthesia Plan  Type of Anesthesia, risks & benefits discussed:    Anesthesia Type: Gen Natural Airway  Intra-op Monitoring Plan: Standard ASA Monitors  Post Op Pain Control Plan: multimodal analgesia  Induction:  IV  Informed Consent: Informed consent signed with the Patient and all parties understand the risks and agree with anesthesia plan.  All questions answered. Patient consented to blood products? No  ASA Score: 3  Day of Surgery Review of History & Physical: H&P Update referred to the surgeon/provider.I have interviewed and examined the patient. I have reviewed the patient's H&P dated: There are no significant  changes.     Ready For Surgery From Anesthesia Perspective.     .

## 2024-02-08 LAB
DHEA SERPL-MCNC: NORMAL
ESTROGEN SERPL-MCNC: NORMAL PG/ML
INSULIN SERPL-ACNC: NORMAL U[IU]/ML
LAB AP GROSS DESCRIPTION: NORMAL
LAB AP LABORATORY NOTES: NORMAL
T3RU NFR SERPL: NORMAL %

## 2024-06-05 ENCOUNTER — OFFICE VISIT (OUTPATIENT)
Dept: NEPHROLOGY | Facility: CLINIC | Age: 64
End: 2024-06-05
Payer: MEDICARE

## 2024-06-05 VITALS
BODY MASS INDEX: 33.72 KG/M2 | RESPIRATION RATE: 18 BRPM | HEART RATE: 75 BPM | OXYGEN SATURATION: 96 % | WEIGHT: 202.38 LBS | HEIGHT: 65 IN | SYSTOLIC BLOOD PRESSURE: 126 MMHG | DIASTOLIC BLOOD PRESSURE: 72 MMHG

## 2024-06-05 DIAGNOSIS — N18.32 STAGE 3B CHRONIC KIDNEY DISEASE: Primary | ICD-10-CM

## 2024-06-05 PROCEDURE — 99215 OFFICE O/P EST HI 40 MIN: CPT | Mod: PBBFAC | Performed by: INTERNAL MEDICINE

## 2024-06-05 PROCEDURE — 99214 OFFICE O/P EST MOD 30 MIN: CPT | Mod: S$PBB,,, | Performed by: INTERNAL MEDICINE

## 2024-06-05 RX ORDER — POLYETHYLENE GLYCOL 3350, SODIUM SULFATE ANHYDROUS, SODIUM BICARBONATE, SODIUM CHLORIDE, POTASSIUM CHLORIDE 236; 22.74; 6.74; 5.86; 2.97 G/4L; G/4L; G/4L; G/4L; G/4L
POWDER, FOR SOLUTION ORAL
COMMUNITY
Start: 2024-01-31

## 2024-06-05 RX ORDER — CETIRIZINE HYDROCHLORIDE 10 MG/1
10 TABLET ORAL
COMMUNITY
Start: 2024-05-21

## 2024-06-05 RX ORDER — PROMETHAZINE HYDROCHLORIDE AND DEXTROMETHORPHAN HYDROBROMIDE 6.25; 15 MG/5ML; MG/5ML
SYRUP ORAL
COMMUNITY
Start: 2024-04-23 | End: 2024-06-05

## 2024-06-05 RX ORDER — BENZONATATE 100 MG/1
100 CAPSULE ORAL
COMMUNITY
Start: 2024-04-23 | End: 2024-06-05

## 2024-06-05 NOTE — PROGRESS NOTES
[facility] History and Physical  Patient Name: Tamera Hernandez  MRN: [id number]  Age: 63 y.o.  : 1960    Date: 2024 10:20 AM    Chief Complaint: Follow Up    HPI :   Ms Hernandez presents to Nephrology clinic for follow up. She is followed by KATHY Juarez as her primary care provider.     HTN: diagnosed about 4 years ago. Current regimen includes losartan-hydrochlorothiazide 100-25 mg daily. Well controlled.     DLD: on statin    Insomnia: on adderall     Arthritis,chronic back pain: off mobic     History of nephrolithiasis: history of lithotripsy. Been several years. No recent stones.  Unsure type.     Nephrology history:   Patient recently hospitalized 10/25-10/29 due to N/V/D associated with cholecystitis. Patient found to have pre-renal KHADIJAH during that time. Peak sCr 7.15 during her stay. She improved with IVF. She is now s/p cholecystectomy on 10/28 with Dr. Hernandez.  She was referred after hospitalization during which she had an KHADIJAH. sCr improved to 1.1 after her last visit. No FH of kidney disease, no recurrent UTIs. No OTC medications including NSAIDs. She was previously on mobic for years.      Patient denies any CP, SOB, peripheral edema, dysuria, hematuria, changes in urinary habits, or increased frequency of urination.     Past Medical History:  has a past medical history of Chronic kidney disease, unspecified, Essential (primary) hypertension, Hypothyroidism, unspecified, and Mixed hyperlipidemia.     Past Surgical History:   has a past surgical history that includes Hysterectomy; Oophorectomy; Laparoscopic cholecystectomy (N/A, 10/27/2022); and Tonsillectomy and adenoidectomy.     Family History:  family history is not on file. She was adopted. No family history of kidney disease.     Social History:   reports that she has quit smoking. Her smoking use included vaping with nicotine and cigarettes. She has a 40 pack-year smoking  history. She has never used smokeless tobacco. She reports that she does not drink alcohol and does not use drugs. 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     Allergies: has No Known Allergies.     Medications: Reviewed including OTC medications, herbal supplements, and NSAIDS.     Old records have been reviewed.      Review of Systems:  ROS: A 10 point ROS was completed and found to be negative except for that mentioned above.          Physical Exam:  Vitals:    06/05/24 1314   BP: 126/72   Pulse: 75   Resp: 18         Constitutional: sitting in chair, in NAD  Eyes: EOMI, white sclera  ENMT: moist mucus membranes, nares patent  Cardiovascular: normal rate, S1/S2 noted, no edema  Respiratory: symmetrical chest expansion, CTA-B  Gastrointestinal: +BS, soft, NT/ND  Musculoskeletal: normal, no joint erythema/effusions  Skin: no rash, no purpura, warm extremities  Neurological: Alert and Oriented x 4, afocal    Labs:   Lab Results   Component Value Date     12/05/2023    K 4.4 12/05/2023    CREATININE 1.36 (H) 12/05/2023    ALT 31 10/25/2022    AST 22 10/25/2022    TSH 1.570 10/25/2022    WBC 6.49 12/05/2023    HGB 13.2 12/05/2023    HCT 40.5 12/05/2023     12/05/2023        Otherwise Reviewed    Assessment/Plan:       CKD stage III with recent KHADIJAH, in setting of pre-renal losses now resolved. 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 Baseline sCr 1.3. Repeat labs today pending. Counseled to avoid nephrotoxic agents such as NSAIDs.     Proteinuria: urine Prot:Creat ratio is pending . Patient is on RAAS blockade with ARB    Anemia: CBC pending    BMD: Calcium and Phosphorus PTH Vit D today    HTN: well controlled with current meds     RTC 6 months with CBC, RFP, UA, urine for Prot:creat ratio, PTH, Vit D      Alisha S. Parker, DO [facility]   06/05/2024

## 2024-07-17 ENCOUNTER — ANESTHESIA (OUTPATIENT)
Dept: GASTROENTEROLOGY | Facility: HOSPITAL | Age: 64
End: 2024-07-17
Payer: MEDICARE

## 2024-07-17 ENCOUNTER — ANESTHESIA EVENT (OUTPATIENT)
Dept: GASTROENTEROLOGY | Facility: HOSPITAL | Age: 64
End: 2024-07-17
Payer: MEDICARE

## 2024-07-17 ENCOUNTER — HOSPITAL ENCOUNTER (OUTPATIENT)
Dept: GASTROENTEROLOGY | Facility: HOSPITAL | Age: 64
Discharge: HOME | End: 2024-07-17
Admitting: INTERNAL MEDICINE
Payer: MEDICARE

## 2024-07-17 VITALS
TEMPERATURE: 97 F | DIASTOLIC BLOOD PRESSURE: 63 MMHG | HEIGHT: 65 IN | SYSTOLIC BLOOD PRESSURE: 128 MMHG | BODY MASS INDEX: 31.65 KG/M2 | OXYGEN SATURATION: 96 % | WEIGHT: 190 LBS | RESPIRATION RATE: 13 BRPM | HEART RATE: 60 BPM

## 2024-07-17 DIAGNOSIS — Z12.11 SCREENING FOR COLON CANCER: ICD-10-CM

## 2024-07-17 PROCEDURE — 27000284 HC CANNULA NASAL

## 2024-07-17 PROCEDURE — 45380 COLONOSCOPY AND BIOPSY: CPT | Mod: PT | Performed by: INTERNAL MEDICINE

## 2024-07-17 PROCEDURE — 37000009 HC ANESTHESIA EA ADD 15 MINS

## 2024-07-17 PROCEDURE — 88305 TISSUE EXAM BY PATHOLOGIST: CPT | Mod: 26,,, | Performed by: PATHOLOGY

## 2024-07-17 PROCEDURE — 45380 COLONOSCOPY AND BIOPSY: CPT | Mod: PT,,, | Performed by: INTERNAL MEDICINE

## 2024-07-17 PROCEDURE — 25000003 PHARM REV CODE 250

## 2024-07-17 PROCEDURE — 37000008 HC ANESTHESIA 1ST 15 MINUTES

## 2024-07-17 PROCEDURE — D9220A PRA ANESTHESIA: Mod: PT,,,

## 2024-07-17 PROCEDURE — 88305 TISSUE EXAM BY PATHOLOGIST: CPT | Mod: TC,SUR | Performed by: INTERNAL MEDICINE

## 2024-07-17 PROCEDURE — 63600175 PHARM REV CODE 636 W HCPCS

## 2024-07-17 RX ORDER — LIDOCAINE HYDROCHLORIDE 20 MG/ML
INJECTION, SOLUTION EPIDURAL; INFILTRATION; INTRACAUDAL; PERINEURAL
Status: DISCONTINUED | OUTPATIENT
Start: 2024-07-17 | End: 2024-07-17

## 2024-07-17 RX ORDER — PROPOFOL 10 MG/ML
VIAL (ML) INTRAVENOUS
Status: DISCONTINUED | OUTPATIENT
Start: 2024-07-17 | End: 2024-07-17

## 2024-07-17 RX ORDER — SODIUM CHLORIDE 0.9 % (FLUSH) 0.9 %
10 SYRINGE (ML) INJECTION
Status: DISCONTINUED | OUTPATIENT
Start: 2024-07-17 | End: 2024-07-18 | Stop reason: HOSPADM

## 2024-07-17 RX ADMIN — LIDOCAINE HYDROCHLORIDE 80 MG: 20 INJECTION, SOLUTION INTRAVENOUS at 09:07

## 2024-07-17 RX ADMIN — PROPOFOL 50 MG: 10 INJECTION, EMULSION INTRAVENOUS at 09:07

## 2024-07-17 RX ADMIN — SODIUM CHLORIDE: 9 INJECTION, SOLUTION INTRAVENOUS at 09:07

## 2024-07-17 NOTE — ANESTHESIA PREPROCEDURE EVALUATION
07/17/2024  Tamera Hernandez is a 64 y.o., female.  Current Outpatient Medications on File Prior to Encounter   Medication Sig Dispense Refill    ascorbic acid, vitamin C, (VITAMIN C) 1000 MG tablet Take 1,000 mg by mouth once daily.      aspirin (ECOTRIN) 81 MG EC tablet Take 81 mg by mouth once daily.      b complex vitamins capsule Take 1 capsule by mouth once daily.      calcium carbonate (OS-TAB) 600 mg calcium (1,500 mg) Tab Take 600 mg by mouth once.      cetirizine (ZYRTEC) 10 MG tablet Take 10 mg by mouth.      citalopram (CELEXA) 40 MG tablet 1 tablet Orally Once a day      estradioL (ESTRACE) 1 MG tablet Take 1 mg by mouth once daily.      famotidine (PEPCID) 20 MG tablet Take 20 mg by mouth 2 (two) times daily.      fluticasone propionate (FLONASE) 50 mcg/actuation nasal spray 2 sprays by Each Nostril route.      levothyroxine (SYNTHROID) 137 MCG Tab tablet Take 137 mcg by mouth every morning. take on an empty stomach      losartan-hydrochlorothiazide 100-25 mg (HYZAAR) 100-25 mg per tablet 1 tablet Orally Once a day      methylphenidate HCl 36 MG CR tablet Take 36 mg by mouth every morning.      montelukast (SINGULAIR) 10 mg tablet TAKE 1 TABLET BY MOUTH ONCE DAILY IN THE EVENING FOR 90 DAYS      omeprazole (PRILOSEC) 40 MG capsule Take 40 mg by mouth once daily.      OXYGEN-AIR DELIVERY SYSTEMS MISC as directed      polyethylene glycol (GOLYTELY) 236-22.74-6.74 -5.86 gram suspension TAKE AS DIRECTED (Patient not taking: Reported on 6/5/2024)      simvastatin (ZOCOR) 40 MG tablet Take 40 mg by mouth every evening.       No current facility-administered medications on file prior to encounter.     Active Ambulatory Problems     Diagnosis Date Noted    Right hip pain 08/18/2021    Greater trochanteric bursitis, right 08/18/2021    Iliotibial band tendonitis of right side 08/18/2021    Obesity (BMI  30-39.9) 10/25/2022    Stage 3a chronic kidney disease 06/05/2023    Essential hypertension 06/05/2023    Hypertensive nephrosclerosis 06/05/2023    Gastroesophageal reflux disease without esophagitis 02/07/2024     Resolved Ambulatory Problems     Diagnosis Date Noted    Sacroiliac joint dysfunction of right side 08/18/2021    Cholelithiasis with acute cholecystitis 10/25/2022    Acute renal failure 10/25/2022    Hyponatremia 10/25/2022    Diarrhea 10/25/2022    Nausea & vomiting 10/25/2022     Past Medical History:   Diagnosis Date    Chronic kidney disease, unspecified     Essential (primary) hypertension     Hypothyroidism, unspecified     Mixed hyperlipidemia      Past Surgical History:   Procedure Laterality Date    HYSTERECTOMY      LAPAROSCOPIC CHOLECYSTECTOMY N/A 10/27/2022    Procedure: CHOLECYSTECTOMY, LAPAROSCOPIC;  Surgeon: Maxime Hernandez DO;  Location: [facility];  Service: General;  Laterality: N/A;    OOPHORECTOMY      TONSILLECTOMY AND ADENOIDECTOMY           Pre-op Assessment    I have reviewed the Patient Summary Reports.     I have reviewed the Nursing Notes. I have reviewed the NPO Status.   I have reviewed the Medications.     Review of Systems  Anesthesia Hx:  No problems with previous Anesthesia             Denies Family Hx of Anesthesia complications.    Denies Personal Hx of Anesthesia complications.                    Social:  No Alcohol Use, Vaping       Hematology/Oncology:  Hematology Normal   Oncology Normal                                   EENT/Dental:  EENT/Dental Normal           Cardiovascular:  Exercise tolerance: good   Hypertension           hyperlipidemia   ECG has been reviewed.                          Pulmonary:         Wears home 02 at night (2.5L)               Renal/:  Chronic Renal Disease, CKD                Hepatic/GI:     GERD             Musculoskeletal:  Musculoskeletal Normal                Neurological:  Neurology Normal                                       Endocrine:   Hypothyroidism        Obesity / BMI > 30  Dermatological:  Skin Normal    Psych:  Psychiatric Normal                    Chemistry        Component Value Date/Time     06/05/2024 1352    K 4.5 06/05/2024 1352     (H) 06/05/2024 1352    CO2 29 06/05/2024 1352    BUN 37 (H) 06/05/2024 1352    CREATININE 1.45 (H) 06/05/2024 1352     06/05/2024 1352        Component Value Date/Time    CALCIUM 8.7 06/05/2024 1352    ALKPHOS 131 (H) 10/25/2022 1055    AST 22 10/25/2022 1055    ALT 31 10/25/2022 1055    BILITOT 0.3 10/25/2022 1055        Lab Results   Component Value Date    WBC 6.69 06/05/2024    HGB 12.5 06/05/2024    HCT 38.7 06/05/2024    MCV 90.8 06/05/2024     06/05/2024       Results for orders placed or performed during the hospital encounter of 10/25/22   EKG 12-lead    Collection Time: 10/25/22 11:21 AM    Narrative    Test Reason : Z01.818,    Vent. Rate : 083 BPM     Atrial Rate : 083 BPM     P-R Int : 160 ms          QRS Dur : 086 ms      QT Int : 466 ms       P-R-T Axes : 049 013 020 degrees     QTc Int : 547 ms    Sinus rhythm with Premature atrial complexes  Nonspecific ST abnormality  Prolonged QT  Abnormal ECG  No previous ECGs available  Confirmed by Jt Marroquin DO ([id number]) on 11/10/2022 4:31:07 PM    Referred By: LAUREEN   SELF           Confirmed By:Jt Marroquin DO         Physical Exam  General: Well nourished, Cooperative, Alert and Oriented    Airway:  Mallampati: II   Mouth Opening: Normal  TM Distance: Normal  Tongue: Normal  Neck ROM: Normal ROM    Dental:  Partial Dentures    Chest/Lungs:  Normal Respiratory Rate        Anesthesia Plan  Type of Anesthesia, risks & benefits discussed:    Anesthesia Type: Gen Natural Airway, MAC  Intra-op Monitoring Plan: Standard ASA Monitors  Post Op Pain Control Plan: multimodal analgesia  Induction:  IV  Informed Consent: Informed consent signed with the Patient and all parties understand the risks and agree with  anesthesia plan.  All questions answered. Patient consented to blood products? Yes  ASA Score: 3  Day of Surgery Review of History & Physical: H&P Update referred to the surgeon/provider.I have interviewed and examined the patient. I have reviewed the patient's H&P dated: There are no significant changes.     Ready For Surgery From Anesthesia Perspective.     .

## 2024-07-17 NOTE — DISCHARGE INSTRUCTIONS
Procedure Date  7/17/24     Impression  Overall Impression:   2 subcentimeter polyps in the ascending colon were removed with cold forceps biopsy  Lipoma in the ascending colon  The ileocecal valve, cecum, transverse colon, descending colon and sigmoid colon appeared normal.  (grade 2) hemorrhoids        Recommendation  Await pathology results   Repeat colonoscopy in 7 years         Outcome of procedure: successful Colonoscopy  Disposition: patient to recovery following procedure; discharge to home when appropriate parameters met  Provisions for follow up: please call my office for any unexpected symptoms like chest or abdominal pain or bleeding following your procedure.  Final Diagnosis: colon polyps     THE NURSE WILL CALL YOU WITH YOUR BIOPSY RESULTS IN A FEW DAYS. IF YOU HAVE  [facility] MYCHART YOUR RESULTS WILL APPEAR THERE AS WELL.  NO DRIVING, OPERATING EQUIPMENT, OR SIGNING LEGAL DOCUMENTS FOR 24 HOURS.

## 2024-07-17 NOTE — H&P
Gastroenterology Pre-procedure H&P    History of Present Illness    Tamera Hernandez is a 64 y.o. female that  has a past medical history of Chronic kidney disease, unspecified, Essential (primary) hypertension, Hypothyroidism, unspecified, and Mixed hyperlipidemia.     Patient here for routine screening     Patient denies wt loss, abdominal pain, diarrhea, melena/hematochezia, change in stool caliber, no anticoagulants, FMHx of GI related malignancies.      Past Medical History:   Diagnosis Date    Chronic kidney disease, unspecified     Essential (primary) hypertension     Hypothyroidism, unspecified     Mixed hyperlipidemia        Past Surgical History:   Procedure Laterality Date    HYSTERECTOMY      LAPAROSCOPIC CHOLECYSTECTOMY N/A 10/27/2022    Procedure: CHOLECYSTECTOMY, LAPAROSCOPIC;  Surgeon: Maxime Hernandez DO;  Location: [facility] [place];  Service: General;  Laterality: N/A;    OOPHORECTOMY      TONSILLECTOMY AND ADENOIDECTOMY         Family History   Adopted: Yes       Social History     Socioeconomic History    Marital status:    Tobacco Use    Smoking status: Every Day     Current packs/day: 1.00     Average packs/day: 1 pack/day for 40.0 years (40.0 ttl pk-yrs)     Types: Vaping with nicotine, Cigarettes    Smokeless tobacco: Never    Tobacco comments:     Currently vapes with nicotine   Substance and Sexual Activity    Alcohol use: Never    Drug use: Never    Sexual activity: Not Currently       Current Outpatient Medications   Medication Sig Dispense Refill    ascorbic acid, vitamin C, (VITAMIN C) 1000 MG tablet Take 1,000 mg by mouth once daily.      aspirin (ECOTRIN) 81 MG EC tablet Take 81 mg by mouth once daily.      b complex vitamins capsule Take 1 capsule by mouth once daily.      calcium carbonate (OS-TAB) 600 mg calcium (1,500 mg) Tab Take 600 mg by mouth once.      cetirizine (ZYRTEC) 10 MG tablet Take 10 mg by mouth.      citalopram (CELEXA) 40 MG tablet 1 tablet Orally Once a day    "   estradioL (ESTRACE) 1 MG tablet Take 1 mg by mouth once daily.      famotidine (PEPCID) 20 MG tablet Take 20 mg by mouth 2 (two) times daily.      fluticasone propionate (FLONASE) 50 mcg/actuation nasal spray 2 sprays by Each Nostril route.      levothyroxine (SYNTHROID) 137 MCG Tab tablet Take 137 mcg by mouth every morning. take on an empty stomach      losartan-hydrochlorothiazide 100-25 mg (HYZAAR) 100-25 mg per tablet 1 tablet Orally Once a day      methylphenidate HCl 36 MG CR tablet Take 36 mg by mouth every morning.      montelukast (SINGULAIR) 10 mg tablet TAKE 1 TABLET BY MOUTH ONCE DAILY IN THE EVENING FOR 90 DAYS      omeprazole (PRILOSEC) 40 MG capsule Take 40 mg by mouth once daily.      OXYGEN-AIR DELIVERY SYSTEMS MISC as directed      polyethylene glycol (GOLYTELY) 236-22.74-6.74 -5.86 gram suspension TAKE AS DIRECTED (Patient not taking: Reported on 6/5/2024)      simvastatin (ZOCOR) 40 MG tablet Take 40 mg by mouth every evening.       No current facility-administered medications for this encounter.       Review of patient's allergies indicates:  No Known Allergies    Objective:  Vitals:    07/17/24 0732   BP: (!) 118/56   Pulse: (!) 58   Resp: 20   Temp: 98 °F (36.7 °C)   TempSrc: Oral   SpO2: 98%   Weight: 86.2 kg (190 lb)   Height: 5' 5" (1.651 m)        GEN: normal appearing, NAD, AAO x3  HENT: NCAT, anicteric, OP benign  CV: bradycardic, regular rhythm  RESP: NABS, symmetric rise, unlabored  ABD: soft, ND, no guarding or TTP  SKIN: warm and dry  NEURO: grossly afocal    Assessment and Plan:    Proceed with:    Colonoscopy for screening for colon cancer    Rayshawn Marroquin MD  Gastroenterology  "

## 2024-07-17 NOTE — ANESTHESIA POSTPROCEDURE EVALUATION
Anesthesia Post Evaluation    Patient: Tamera Hernandez    Procedure(s) Performed: * Colonoscopy *    Final Anesthesia Type: MAC      Patient location during evaluation: GI PACU  Patient participation: Yes- Able to Participate  Level of consciousness: awake and alert  Post-procedure vital signs: reviewed and stable  Pain management: adequate  Airway patency: patent    PONV status at discharge: No PONV  Anesthetic complications: no      Cardiovascular status: blood pressure returned to baseline and hemodynamically stable  Respiratory status: spontaneous ventilation  Hydration status: euvolemic  Follow-up not needed.  Comments: Refer to nursing notes for pain/akilah score upon discharge from recovery.              Vitals Value Taken Time   /66 07/17/24 1006   Temp 36.1 °C (97 °F) 07/17/24 0935   Pulse 60 07/17/24 1006   Resp 10 07/17/24 1005   SpO2 96 % 07/17/24 1005   Vitals shown include unfiled device data.      Event Time   Out of Recovery 10:15:52         Pain/Akilah Score: Akilah Score: 10 (7/17/2024  9:46 AM)

## 2024-07-17 NOTE — TRANSFER OF CARE
"Anesthesia Transfer of Care Note    Patient: Tamera Hernandez    Procedure(s) Performed: * Colonoscopy*    Patient location: GI    Anesthesia Type: MAC    Transport from OR: Transported from OR on room air with adequate spontaneous ventilation. Continuous ECG monitoring in transport. Continuous SpO2 monitoring in transport    Post pain: adequate analgesia    Post assessment: no apparent anesthetic complications    Post vital signs: stable    Level of consciousness: sedated and responds to stimulation    Nausea/Vomiting: no nausea/vomiting    Complications: none    Transfer of care protocol was followedComments: Good SV continue, NAD, VSS, RTRN      Last vitals: Visit Vitals  BP (!) 104/50 (BP Location: Left arm, Patient Position: Lying)   Pulse 60   Temp 36.1 °C (97 °F) (Skin)   Resp 15   Ht 5' 5" (1.651 m)   Wt 86.2 kg (190 lb)   SpO2 97%   Breastfeeding No   BMI 31.62 kg/m²     "

## 2024-07-18 LAB
ESTROGEN SERPL-MCNC: NORMAL PG/ML
INSULIN SERPL-ACNC: NORMAL U[IU]/ML
LAB AP GROSS DESCRIPTION: NORMAL
LAB AP LABORATORY NOTES: NORMAL
T3RU NFR SERPL: NORMAL %

## 2024-07-31 ENCOUNTER — OFFICE VISIT (OUTPATIENT)
Dept: GASTROENTEROLOGY | Facility: CLINIC | Age: 64
End: 2024-07-31
Payer: MEDICARE

## 2024-07-31 VITALS
DIASTOLIC BLOOD PRESSURE: 66 MMHG | HEART RATE: 68 BPM | BODY MASS INDEX: 33.12 KG/M2 | HEIGHT: 65 IN | SYSTOLIC BLOOD PRESSURE: 125 MMHG | WEIGHT: 198.81 LBS

## 2024-07-31 DIAGNOSIS — K21.00 GASTROESOPHAGEAL REFLUX DISEASE WITH ESOPHAGITIS WITHOUT HEMORRHAGE: ICD-10-CM

## 2024-07-31 DIAGNOSIS — K59.04 CHRONIC IDIOPATHIC CONSTIPATION: Primary | ICD-10-CM

## 2024-07-31 PROCEDURE — 99214 OFFICE O/P EST MOD 30 MIN: CPT | Mod: PBBFAC

## 2024-07-31 PROCEDURE — 99214 OFFICE O/P EST MOD 30 MIN: CPT | Mod: S$PBB,,,

## 2024-07-31 PROCEDURE — 99999 PR PBB SHADOW E&M-EST. PATIENT-LVL IV: CPT | Mod: PBBFAC,,,

## 2024-08-16 NOTE — PROGRESS NOTES
Gastroenterology Clinic Note    Patient ID: [id number]   Referring MD: No ref. provider found   Chief Complaint:   Chief Complaint   Patient presents with    Follow-up     No problems       History of Present Illness   Tamera Hernandez is an 64 y.o. female who is referred for GERD. Patient complains of increased symptoms of acid reflux, abdominal bloating, and constipation. She has been using stool softener without relief. She is taking Prilosec 40 mg daily and Pepcid 20 mg BID. She also reports dysphagia with solid foods. She denies any hematochezia or melena. She is adopted so full FMH is unknown.      Previous workup:  EGD    Last colonoscopy 07/17/2024 with 7 year recall for screening purposes.    Interval  - constipation did not improve with daily fiber and MiraLax  - GERD is controlled on current medications  - EGD revealed small type 1 hiatal hernia and grade a esophagitis; H pylori negative    Review of Systems   Constitutional:  Negative for weight loss.   Gastrointestinal:  Positive for constipation. Negative for abdominal pain, blood in stool, diarrhea, heartburn, melena, nausea and vomiting.       Past Medical History      Past Medical History:   Diagnosis Date    Chronic kidney disease, unspecified     Essential (primary) hypertension     Hypothyroidism, unspecified     Mixed hyperlipidemia        Past Surgical History     Past Surgical History:   Procedure Laterality Date    HYSTERECTOMY      LAPAROSCOPIC CHOLECYSTECTOMY N/A 10/27/2022    Procedure: CHOLECYSTECTOMY, LAPAROSCOPIC;  Surgeon: Maxime Hernandez DO;  Location: [facility];  Service: General;  Laterality: N/A;    OOPHORECTOMY      TONSILLECTOMY AND ADENOIDECTOMY         Allergies   Review of patient's allergies indicates:  No Known Allergies    Immunization History     There is no immunization history on file for this patient.    Past Family History      Family History   Adopted: Yes       Past Social History      Social History      Socioeconomic History    Marital status:    Tobacco Use    Smoking status: Every Day     Types: Vaping with nicotine    Smokeless tobacco: Never    Tobacco comments:     Currently vapes with nicotine   Substance and Sexual Activity    Alcohol use: Never    Drug use: Never    Sexual activity: Not Currently       Current Medications     Outpatient Medications Marked as Taking for the 7/31/24 encounter (Office Visit) with Jenny Ceja FNP   Medication Sig Dispense Refill    ascorbic acid, vitamin C, (VITAMIN C) 1000 MG tablet Take 1,000 mg by mouth once daily.      aspirin (ECOTRIN) 81 MG EC tablet Take 81 mg by mouth once daily.      b complex vitamins capsule Take 1 capsule by mouth once daily.      calcium carbonate (OS-TAB) 600 mg calcium (1,500 mg) Tab Take 600 mg by mouth once.      cetirizine (ZYRTEC) 10 MG tablet Take 10 mg by mouth.      citalopram (CELEXA) 40 MG tablet 1 tablet Orally Once a day      estradioL (ESTRACE) 1 MG tablet Take 1 mg by mouth once daily.      famotidine (PEPCID) 20 MG tablet Take 20 mg by mouth 2 (two) times daily.      fluticasone propionate (FLONASE) 50 mcg/actuation nasal spray 2 sprays by Each Nostril route.      levothyroxine (SYNTHROID) 137 MCG Tab tablet Take 137 mcg by mouth every morning. take on an empty stomach      losartan-hydrochlorothiazide 100-25 mg (HYZAAR) 100-25 mg per tablet 1 tablet Orally Once a day      methylphenidate HCl 36 MG CR tablet Take 36 mg by mouth every morning.      montelukast (SINGULAIR) 10 mg tablet TAKE 1 TABLET BY MOUTH ONCE DAILY IN THE EVENING FOR 90 DAYS      omeprazole (PRILOSEC) 40 MG capsule Take 40 mg by mouth once daily.      OXYGEN-AIR DELIVERY SYSTEMS MISC as directed      polyethylene glycol (GOLYTELY) 236-22.74-6.74 -5.86 gram suspension       simvastatin (ZOCOR) 40 MG tablet Take 40 mg by mouth every evening.          I have reviewed the current medications, allergies, vital signs, past medical and surgical history,  "family medical history, and social history for this encounter and agree with all findings.    OBJECTIVE    Physical Exam    /66   Pulse 68   Ht 5' 5" (1.651 m)   Wt 90.2 kg (198 lb 12.8 oz)   BMI 33.08 kg/m²   GEN: Well appearing, cooperative, NAD  NECK: Supple, no LAD  CV: Normal rate  RESP: Unlabored  ABD: ND, no guarding  EXT: No clubbing, cyanosis, or edema  SKIN: Warm and dry  NEURO: AAO x4.     LABS    CBC (with or without Differential):   Lab Results   Component Value Date    WBC 6.69 06/05/2024    HGB 12.5 06/05/2024    HCT 38.7 06/05/2024    MCV 90.8 06/05/2024    MCH 29.3 06/05/2024    MCHC 32.3 06/05/2024    RDW 12.8 06/05/2024     06/05/2024    MPV 10.0 06/05/2024    NEUTOPHILPCT 63.3 06/05/2024    DIFFTYPE Auto 06/05/2024     BMP/CMP:   Lab Results   Component Value Date     06/05/2024    K 4.5 06/05/2024     (H) 06/05/2024    CO2 29 06/05/2024    BUN 37 (H) 06/05/2024    CREATININE 1.45 (H) 06/05/2024     06/05/2024    CALCIUM 8.7 06/05/2024    ALBUMIN 3.4 (L) 06/05/2024    AST 22 10/25/2022    ALT 31 10/25/2022    ALKPHOS 131 (H) 10/25/2022    MG 1.7 10/29/2022        IMAGING  No pertinent imaging.     ASSESSMENT  Tamera Hernandez is a 64 y.o. WF with PMH of HTN, CKD, and GERD who is referred for GERD.    1. Chronic idiopathic constipation    2. Gastroesophageal reflux disease with esophagitis without hemorrhage           PLAN    - Linzess 145 mcg daily for chronic constipation  - continue Prilosec 40 mg daily for GERD with Pepcid as needed  - return to GI clinic for follow-up in 1 year, sooner as needed     There are no Patient Instructions on file for this visit.      No orders of the defined types were placed in this encounter.        The risks and benefits of my recommendations, as well as other treatment options were discussed with the patient today. All questions were answered.    35 minutes of total time spent on the encounter, which includes face to face " time and non-face to face time preparing to see the patient (eg, review of tests), obtaining and/or reviewing separately obtained history, documenting clinical information in the electronic or other health record, Independently interpreting results (not separately reported) and communicating results to the patient/family/caregiver, or care coordination (not separately reported).        Jenny Ceja, FNP/ACNP  [facility]

## 2024-12-06 ENCOUNTER — OFFICE VISIT (OUTPATIENT)
Dept: DERMATOLOGY | Facility: CLINIC | Age: 64
End: 2024-12-06
Payer: MEDICARE

## 2024-12-06 DIAGNOSIS — L57.0 AK (ACTINIC KERATOSIS): Primary | ICD-10-CM

## 2024-12-06 DIAGNOSIS — L82.1 SK (SEBORRHEIC KERATOSIS): ICD-10-CM

## 2024-12-06 DIAGNOSIS — D22.9 BENIGN NEVUS OF SKIN: ICD-10-CM

## 2024-12-06 NOTE — PATIENT INSTRUCTIONS
"Sunscreen Recommendations  I recommended a broad spectrum sunscreen with a SPF of 30 or higher that is water-resistant. SPF 30 sunscreens block approximately 97 percent of the sun's harmful rays.   Sunscreens should be applied at least 15 minutes prior to expected sun exposure and then every 90 minutes after that as long as sun exposure continues.   If swimming or exercising sunscreen should be reapplied every 45 minutes to an hour after getting wet or sweating.  One ounce, or the equivalent of a shot glass full of sunscreen, is adequate to protect the skin not covered by a bathing suit.   I also recommended a lip balm with a sunscreen as well.   Healthy Sun Protective Behaviors  Sun protective clothing can be used in lieu of sunscreen but must be worn the entire time you are exposed to the sun's rays.  Seek shade between 10 a.m. and 2 p.m.  Use extra caution near water, snow, or sand as they reflect sun rays  Avoid tanning beds and consider sunless self-tanning products instead  Perform monthly self skin exams    Liquid Nitrogen Wound Care     - the areas treated with liquid nitrogen may form sores, blisters, and scabs. This is normal   - if a blister forms, please keep it intact and do not rupture it. It will resolve within a few days   - please keep petrolatum ointment to the area once to twice daily. You can cover with a bandage if you wish, but it is usually not necessary   - the area may be painful for a few days. We recommend ice, or over the counter tylenol or NSAIDs (such as ibuprofen or naproxen, if you are able to take these)   - this may result in a scar or a "hypopigmented" or lighter area of skin. This may or may not resolve with time   - please call our clinic if the lesion does not resolve, as this can be treated again     "

## 2024-12-06 NOTE — PROGRESS NOTES
[facility]  Jamey Snell MD    [address] [address]  [phone]    Fax: [phone]    Patient Name: Tamera Hernandez  Medical Record Number: [id number]  PCP: Janey Willis FNP  Age: 64 y.o. : 1960  Contact: [phone] (home)     CC: Lesion  History of Present Illness:     Tamera Hernandez is a 64 y.o.  female with no history of skin cancer who presents to clinic today for lesion located on the bilateral legs, left ear, and face.     The patient has no other concerns today.    Review of Systems:     Unremarkable other than mentioned above.     Physical Exam:     General: Relaxed, oriented, alert    Skin examination of the scalp, face, neck, chest, back, abdomen, upper extremities and lower extremities were normal except for as listed below      Assessment and Plan:     1. Seborrheic keratoses   - brown stuck on appearing papules/plaques  - patient educated on benign nature. No treatment necessary unless they become irritated or inflamed       2. Actinic Keratoses  Erythematous, scaly papules on left cheek,     Plan: Liquid Nitrogen.  A total of 1 lesions were treated with liquid nitrogen for 2 freeze-thaw cycles lasting 5 seconds, located on the above locations.   The patient's consent was obtained including but not limited to risks of crusting, scabbing,  blistering, scarring, darker or lighter pigmentary change, recurrence, incomplete removal and infection.    Counseling.  Sun protective clothing and broad spectrum sunscreen can prevent the formation of AK.   AKs can be treated with cryotherapy, photodynamic therapy, imiquimod, topical 5-FU.  Actinic Keratoses are precancerous proliferations that occur within sun damaged skin. If untreated,  a small subset of AKs can develop into Squamous Cell Carcinoma.      3. Benign Nevus   - Dome shaped regular papule    Plan: Reassurance.    Counseling.  Monthly self-skin checks to monitor for any changes in  moles are recommended.  Contact Office if: Any moles change in size, shape or color; itch, burn or bleed.  Discussed use of sunscreen SPF 30 or great         Jamey Snell MD   Mohs Surgery/Dermatologic Oncology  Dermatology

## 2025-01-02 RX ORDER — OXCARBAZEPINE 300 MG/1
600 TABLET, FILM COATED ORAL
COMMUNITY

## 2025-01-02 RX ORDER — ATORVASTATIN CALCIUM 20 MG/1
20 TABLET, FILM COATED ORAL
COMMUNITY

## 2025-01-02 RX ORDER — LEVOTHYROXINE SODIUM 150 UG/1
150 TABLET ORAL
COMMUNITY

## 2025-01-03 ENCOUNTER — OFFICE VISIT (OUTPATIENT)
Dept: NEPHROLOGY | Facility: CLINIC | Age: 65
End: 2025-01-03
Payer: MEDICARE

## 2025-01-03 VITALS
RESPIRATION RATE: 18 BRPM | DIASTOLIC BLOOD PRESSURE: 78 MMHG | HEART RATE: 67 BPM | BODY MASS INDEX: 33.99 KG/M2 | OXYGEN SATURATION: 98 % | SYSTOLIC BLOOD PRESSURE: 122 MMHG | WEIGHT: 204 LBS | HEIGHT: 65 IN

## 2025-01-03 DIAGNOSIS — N18.32 CKD STAGE 3B, GFR 30-44 ML/MIN: Primary | ICD-10-CM

## 2025-01-03 DIAGNOSIS — N18.32 STAGE 3B CHRONIC KIDNEY DISEASE: ICD-10-CM

## 2025-01-03 DIAGNOSIS — I12.9 HYPERTENSIVE NEPHROSCLEROSIS, STAGE 1 THROUGH STAGE 4 OR UNSPECIFIED CHRONIC KIDNEY DISEASE: ICD-10-CM

## 2025-01-03 PROCEDURE — 99213 OFFICE O/P EST LOW 20 MIN: CPT | Mod: S$PBB,,, | Performed by: NURSE PRACTITIONER

## 2025-01-03 PROCEDURE — 99999 PR PBB SHADOW E&M-EST. PATIENT-LVL V: CPT | Mod: PBBFAC,,, | Performed by: NURSE PRACTITIONER

## 2025-01-03 PROCEDURE — 99215 OFFICE O/P EST HI 40 MIN: CPT | Mod: PBBFAC | Performed by: NURSE PRACTITIONER

## 2025-01-03 NOTE — PROGRESS NOTES
[facility] History and Physical  Patient Name: Tamera Hernandez  MRN: [id number]  Age: 64 y.o.  : 1960    Date: 1/3/2025 10:20 AM    Chief Complaint: Follow Up    HPI :   Ms Hernandez presents to Nephrology clinic for follow up. She is followed by KATHY Juarez as her primary care provider.     HTN: diagnosed about 4 years ago. Current regimen includes losartan-hydrochlorothiazide 100-25 mg daily. Well controlled.     DLD: on statin    Insomnia: on adderall     Arthritis,chronic back pain: off mobic     History of nephrolithiasis: history of lithotripsy. Been several years. No recent stones.  Unsure type.     Nephrology history:   Hospitalized 10/25-10/29 due to N/V/D associated with cholecystitis. Patient found to have pre-renal KHADIJAH during that time. Peak sCr 7.15 during her stay. She improved with IVF. She is now s/p cholecystectomy on 10/28 with Dr. Hernandez.  She was referred after hospitalization during which she had an KHADIJAH. sCr improved to 1.1 after her last visit. No FH of kidney disease, no recurrent UTIs. No OTC medications including NSAIDs. She was previously on mobic for years.      Patient denies any CP, SOB, peripheral edema, dysuria, hematuria, changes in urinary habits, or increased frequency of urination.     Past Medical History:  has a past medical history of Chronic kidney disease, unspecified, Essential (primary) hypertension, Hypothyroidism, unspecified, and Mixed hyperlipidemia.     Past Surgical History:   has a past surgical history that includes Hysterectomy; Oophorectomy; Laparoscopic cholecystectomy (N/A, 10/27/2022); and Tonsillectomy and adenoidectomy.     Family History:  family history is not on file. She was adopted. No family history of kidney disease.     Social History:   reports that she has been smoking vaping with nicotine. She has never used smokeless tobacco. She reports that she does not drink alcohol and does  not use drugs. 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     Allergies: has No Known Allergies.     Medications: Reviewed including OTC medications, herbal supplements, and NSAIDS.     Old records have been reviewed.      Review of Systems:  ROS: A 10 point ROS was completed and found to be negative except for that mentioned above.          Physical Exam:  Vitals:    01/03/25 1036   BP: 122/78   Pulse: 67   Resp: 18       Constitutional: sitting in chair, in NAD  Eyes: EOMI, white sclera  ENMT: moist mucus membranes, nares patent  Cardiovascular: normal rate, S1/S2 noted, no edema  Respiratory: symmetrical chest expansion, CTA-B  Gastrointestinal: +BS, soft, NT/ND  Musculoskeletal: normal, no joint erythema/effusions  Skin: no rash, no purpura, warm extremities  Neurological: Alert and Oriented x 3, afocal    Labs:   Lab Results   Component Value Date     06/05/2024    K 4.5 06/05/2024    CREATININE 1.45 (H) 06/05/2024    ALT 31 10/25/2022    AST 22 10/25/2022    TSH 1.570 10/25/2022    WBC 6.69 06/05/2024    HGB 12.5 06/05/2024    HCT 38.7 06/05/2024     06/05/2024        Otherwise Reviewed    Assessment/Plan:       CKD stage III with previous KHADIJAH, in setting of pre-renal losses now resolved. 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 Baseline sCr 1.3. Repeat labs today pending. Counseled to avoid nephrotoxic agents such as NSAIDs.     Proteinuria: urine Prot:Creat ratio is pending . Patient is on RAAS blockade with ARB    Anemia: CBC pending    BMD: Calcium and Phosphorus PTH Vit D today    HTN: well controlled with current meds     RTC 6 months with CBC, RFP, UA, urine for Prot:creat ratio, PTH, Vit D    Signature:             KATHY Leal  [facility]  [address]  [place] [address]  [phone]        20 minutes of total time spent on the encounter, which includes face to face  time and non-face to face time preparing to see the patient (eg, review of tests), Obtaining and/or reviewing separately obtained history, Documenting clinical information in the electronic or other health record, Independently interpreting results (not separately reported) and communicating results to the patient/family/caregiver, or Care coordination (not separately reported).       Date of encounter: 1/3/25

## 2025-04-21 DIAGNOSIS — Z96.652 HISTORY OF TOTAL KNEE ARTHROPLASTY, LEFT: Primary | ICD-10-CM

## 2025-05-06 ENCOUNTER — CLINICAL SUPPORT (OUTPATIENT)
Dept: REHABILITATION | Facility: HOSPITAL | Age: 65
End: 2025-05-06
Payer: MEDICARE

## 2025-05-06 DIAGNOSIS — R26.89 DECREASED FUNCTIONAL MOBILITY: ICD-10-CM

## 2025-05-06 DIAGNOSIS — M25.612 DECREASED ROM OF LEFT SHOULDER: ICD-10-CM

## 2025-05-06 DIAGNOSIS — Z74.09 DECREASED FUNCTIONAL MOBILITY AND ENDURANCE: ICD-10-CM

## 2025-05-06 DIAGNOSIS — M25.662 DECREASED RANGE OF MOTION (ROM) OF LEFT KNEE: Primary | ICD-10-CM

## 2025-05-06 DIAGNOSIS — Z96.652 HISTORY OF TOTAL KNEE ARTHROPLASTY, LEFT: ICD-10-CM

## 2025-05-06 PROCEDURE — 97161 PT EVAL LOW COMPLEX 20 MIN: CPT | Mod: PN

## 2025-05-06 PROCEDURE — 97110 THERAPEUTIC EXERCISES: CPT | Mod: PN

## 2025-05-06 NOTE — LETTER
May 6, 2025  Aman Lange Jr., MD  [address] [facility] - [place] [address]    To whom it may concern,     The attached plan of care is being sent to you for review and reference.    You may indicate your approval by signing the document electronically, or by faxing/mailing a signed copy of the final page of this document back to the attention of Ahmet Willis, PT:         Plan of Care 5/6/25   Effective from: 5/6/2025  Effective to: 7/6/2025    Plan ID: [id number]            Participants as of Finalize on 5/6/2025    Name Type Comments Contact Info    Aman Lange Jr., MD Referring Provider  [phone]    Ahmet Willis PT Physical Therapist         Last Plan Note     Author: Ahmet Willis PT Status: Incomplete Last edited: 5/6/2025  1:15 PM         Outpatient Rehab  2  Physical Therapy Evaluation    Patient Name: Tamera Hernandez  MRN: [id number]  YOB: 1960  Encounter Date: 5/6/2025    Therapy Diagnosis:   Encounter Diagnoses   Name Primary?    History of total knee arthroplasty, left     Decreased range of motion (ROM) of left knee Yes    Decreased functional mobility     Decreased ROM of left shoulder     Decreased functional mobility and endurance      Physician: Aman Lange Jr., *    Physician Orders: Eval and Treat  Medical Diagnosis: History of total knee arthroplasty, left    Visit # / Visits Authorized:  1 / 1  Insurance Authorization Period: 4/21/2025 to 4/21/2026  Date of Evaluation: 5/6/2025  Plan of Care Certification: 5/6/2025 to 7/6/2025      Time In: 1310   Time Out: 1400  Total Time (in minutes): 50   Total Billable Time (in minutes): 50    Intake Outcome Measure for FOTO Survey    Therapist reviewed FOTO scores for Tamera Hernandez on 5/6/2025.   FOTO report - see Media section or FOTO account episode details.     Intake Score:  %         Subjective  History of Present Illness  Tamera is a 64 y.o. female who reports to physical therapy with a chief  concern of left knee pain with decreased rom and strength.     The patient reports a medical diagnosis of post op left knee arthroplasty.  Patient reports a surgery of left post op knee arthroplasty. Surgery occurred on 04/21/25.         Dominant Hand: Right  History of Present Condition/Illness: Pt had left total knee replaced on 4/21/2025 and completed home health PT yesterday. Pt voices she is having a hard time with bending her knee and straightening .  Pt voices she has weakness with walking and her knee kendra at times. Pt voices she is  having difficulty with endurance and with stiffness. Pt voices she is unable to perform her adl's such as cleaning and cooking.   Pt is not driving yet .     Pain     Patient reports a current pain level of 5/10. Pain at best is reported as 5/10. Pain at worst is reported as 10/10.   Location: left knee  Clinical Progression (since onset): Stable  Pain Qualities: Aching, Discomfort, Knife-like, Sharp, Tenderness, Tightness, Squeezing, Dull, Burning  Pain-Relieving Factors: Ice, Medications - prescription, Stretching, Walking  Pain-Aggravating Factors: Bending, Twisting, Walking, Stress, Stretching, Standing, Squatting, Sitting, Lifting, Movement         Treatment History  Treatments  Discharged From Past 30 Days: Home health care  Previously Received Treatments: Yes  Currently Receiving Treatments: No    Living Arrangements  Living Situation  Housing: Home independently  Living Arrangements: Spouse/significant other        Employment  Employment Status: Retired          Past Medical History/Physical Systems Review:   Tamera Hernandez  has a past medical history of Chronic kidney disease, unspecified, Essential (primary) hypertension, Hypothyroidism, unspecified, and Mixed hyperlipidemia.    Tamera Hernandez  has a past surgical history that includes Hysterectomy; Oophorectomy; Laparoscopic cholecystectomy (N/A, 10/27/2022); and Tonsillectomy and adenoidectomy.    Tamera has a  current medication list which includes the following prescription(s): ascorbic acid (vitamin c), aspirin, atorvastatin, b complex vitamins, calcium carbonate, cetirizine, citalopram, estradiol, famotidine, fluticasone propionate, levothyroxine, levothyroxine, linaclotide, losartan-hydrochlorothiazide 100-25 mg, methylphenidate hcl, montelukast, omeprazole, oxcarbazepine, oxygen-air delivery systems, polyethylene glycol, and simvastatin.    Review of patient's allergies indicates:  No Known Allergies     Objective      Hip Range of Motion   Right Hip   Active (deg) Passive (deg) Pain   Flexion 110       Extension         ABduction         ADduction         External Rotation 90/90         External Rotation Prone         Internal Rotation 90/90         Internal Rotation Prone             Left Hip   Active (deg) Passive (deg) Pain   Flexion 95       Extension         ABduction         ADduction         External Rotation 90/90         External Rotation Prone         Internal Rotation 90/90         Internal Rotation Prone                  Knee Range of Motion   Right Knee   Active (deg) Passive (deg) Pain   Flexion 130       Extension 0           Left Knee   Active (deg) Passive (deg) Pain   Flexion 100 105     Extension -7           Quad lag with terminal knee extension right   0   left -14     Ankle/Foot Range of Motion   Right Ankle/Foot   Active (deg) Passive (deg) Pain   Dorsiflexion (KE) 10       Dorsiflexion (KF)         Plantar Flexion         Ankle Inversion         Ankle Eversion         Subtalar Inversion         Subtalar Eversion         Great Toe MTP Flexion         Great Toe MTP Extension         Great Toe IP Flexion             Left Ankle/Foot   Active (deg) Passive (deg) Pain   Dorsiflexion (KE) 10       Dorsiflexion (KF)         Plantar Flexion         Ankle Inversion         Ankle Eversion         Subtalar Inversion         Subtalar Eversion         Great Toe MTP Flexion         Great Toe MTP Extension          Great Toe IP Flexion                            Hip Strength - Planes of Motion   Right Strength Right Pain Left Strength Left  Pain   Flexion (L2) 5   3+     Extension 5   3+     ABduction 5   3+     ADduction 5   3+     Internal Rotation 5   3+     External Rotation 5   3+         Knee Strength   Right Strength Right Pain Left Strength Left  Pain   Flexion (S2) 4   3     Prone Flexion 4   3     Extension (L3) 4   3            Ankle/Foot Strength - Planes of Motion   Right Strength Right Pain Left Strength Left  Pain   Dorsiflexion (L4) 5   5     Plantar Flexion (S1) 5   5     Inversion 5   5     Eversion 5   5     Great Toe Flexion 5   5     Great Toe Extension (L5) 5   5     Lesser Toes Flexion 5   5     Lesser Toes Extension 5   5               Treatment:  Therapeutic Exercise  TE 1: pt perfromed home ex program      Time Entry(in minutes):       Assessment & Plan  Assessment  Tamera presents with a condition of Moderate complexity.   Presentation of Symptoms: Evolving  Will Comorbidities Impact Care: No       Functional Limitations: Activity tolerance, Disrupted sleep pattern, Squatting, Standing tolerance, Range of motion, Performing household chores, Participating in sports, Participating in leisure activities, Painful locomotion/ambulation, Pain with ADLs/IADLs, Getting off the floor, Functional mobility, Driving, Completing self-care activities, Completing work/school activities, Decreased ambulation distance/endurance  Impairments: Activity intolerance, Impaired balance, Lack of appropriate home exercise program, Impaired physical strength  Personal Factors Affecting Prognosis: Pain    Prognosis: Excellent  Assessment Details: Pt  2 weeks post op knee replacement left side with decreased flexion and extension. Pt has weak vmo and quads. Pt will benefit from PT to meet goals to regain strength and decreased pains .     Plan  From a physical therapy perspective, the patient would benefit from: Skilled  Rehab Services    Planned therapy interventions include: Therapeutic exercise, Therapeutic activities, Neuromuscular re-education, and Manual therapy.    Planned modalities to include: Ultrasound and Electrical stimulation - passive/unattended.        Visit Frequency: 2 times Per Week for 8 Weeks.       This plan was discussed with Patient, Family, and Therapy assistant.   Discussion participants: Agreed Upon Plan of Care  Plan details:  Plan of care Certification: 5/6/2025 to 7/6/2025 . Outpatient Physical Therapy 2 times weekly for 8 weeks to include the following interventions: Electrical Stimulation nmes , Manual Therapy, Neuromuscular Re-ed, Patient Education, Therapeutic Activities, and Therapeutic Exercise.  Plan of care has been reestablished with Edith BA and Leigha Willis, PT           Patient's spiritual, cultural, and educational needs considered and patient agreeable to plan of care and goals.     Education  Education was done with Patient. The patient's learning style includes Demonstration, Pictures/video, and Listening.                  Goals:   Active       long term goals        pt will ambulate without an assistive device pain free without a limp        Start:  05/06/25    Expected End:  07/06/25            pt will have 5/5 strength left knee        Start:  05/06/25    Expected End:  07/06/25            pt will be able to stoop and bend pain free , be able to return to yard work        Start:  05/06/25    Expected End:  07/06/25               short term goals        pt will be independent with home ex program        Start:  05/06/25    Expected End:  06/06/25            pt will be able to increase knee range of motion 0-130        Start:  05/06/25    Expected End:  06/06/25            decrease quad lag to 0 degrees        Start:  05/06/25    Expected End:  06/06/25            pt will return to driving        Start:  05/06/25    Expected End:  06/06/25                Ahmet  Lazaro PT          I CERTIFY THE NEED FOR THESE SERVICES FURNISHED UNDER THIS PLAN OF TREATMENT AND WHILE UNDER MY CARE.    Physician's comments:      Physician's Signature: ___________________________________________________              Current Participants as of 5/6/2025    Name Type Comments Contact Info    Aman Lange Jr., MD Referring Provider  [phone]    Signature pending    Ahmet Willis, PT Physical Therapist                  Sincerely,      Ahmet Willis, PT  [facility]                                                            Dear Ahmet Willis, PT,    RE: Ms. Tamera Hernandez, MRN: [id number]    I certify that I have reviewed the attached plan of care and agree to the details within.        ___________________________  ___________________________  Provider Printed Name   Provider Signed Name      ___________________________  Date and Time

## 2025-05-06 NOTE — PROGRESS NOTES
Outpatient Rehab  2  Physical Therapy Evaluation    Patient Name: Tamera Hernandez  MRN: [id number]  YOB: 1960  Encounter Date: 5/6/2025    Therapy Diagnosis:   Encounter Diagnoses   Name Primary?    History of total knee arthroplasty, left     Decreased range of motion (ROM) of left knee Yes    Decreased functional mobility     Decreased ROM of left shoulder     Decreased functional mobility and endurance      Physician: Aman Lange Jr., *    Physician Orders: Eval and Treat  Medical Diagnosis: History of total knee arthroplasty, left    Visit # / Visits Authorized:  1 / 1  Insurance Authorization Period: 4/21/2025 to 4/21/2026  Date of Evaluation: 5/6/2025  Plan of Care Certification: 5/6/2025 to 7/6/2025      Time In: 1310   Time Out: 1400  Total Time (in minutes): 50   Total Billable Time (in minutes): 50    Intake Outcome Measure for FOTO Survey    Therapist reviewed FOTO scores for Tamera Hernandez on 5/6/2025.   FOTO report - see Media section or FOTO account episode details.     Intake Score:  %         Subjective   History of Present Illness  Tamera is a 64 y.o. female who reports to physical therapy with a chief concern of left knee pain with decreased rom and strength.     The patient reports a medical diagnosis of post op left knee arthroplasty.  Patient reports a surgery of left post op knee arthroplasty. Surgery occurred on 04/21/25.         Dominant Hand: Right  History of Present Condition/Illness: Pt had left total knee replaced on 4/21/2025 and completed home health PT yesterday. Pt voices she is having a hard time with bending her knee and straightening .  Pt voices she has weakness with walking and her knee kendra at times. Pt voices she is  having difficulty with endurance and with stiffness. Pt voices she is unable to perform her adl's such as cleaning and cooking.   Pt is not driving yet .     Pain     Patient reports a current pain level of 5/10. Pain at best is reported  as 5/10. Pain at worst is reported as 10/10.   Location: left knee  Clinical Progression (since onset): Stable  Pain Qualities: Aching, Discomfort, Knife-like, Sharp, Tenderness, Tightness, Squeezing, Dull, Burning  Pain-Relieving Factors: Ice, Medications - prescription, Stretching, Walking  Pain-Aggravating Factors: Bending, Twisting, Walking, Stress, Stretching, Standing, Squatting, Sitting, Lifting, Movement         Treatment History  Treatments  Discharged From Past 30 Days: Home health care  Previously Received Treatments: Yes  Currently Receiving Treatments: No    Living Arrangements  Living Situation  Housing: Home independently  Living Arrangements: Spouse/significant other        Employment  Employment Status: Retired          Past Medical History/Physical Systems Review:   Tamera Hernandez  has a past medical history of Chronic kidney disease, unspecified, Essential (primary) hypertension, Hypothyroidism, unspecified, and Mixed hyperlipidemia.    Tamera Hernandez  has a past surgical history that includes Hysterectomy; Oophorectomy; Laparoscopic cholecystectomy (N/A, 10/27/2022); and Tonsillectomy and adenoidectomy.    Tamera has a current medication list which includes the following prescription(s): ascorbic acid (vitamin c), aspirin, atorvastatin, b complex vitamins, calcium carbonate, cetirizine, citalopram, estradiol, famotidine, fluticasone propionate, levothyroxine, levothyroxine, linaclotide, losartan-hydrochlorothiazide 100-25 mg, methylphenidate hcl, montelukast, omeprazole, oxcarbazepine, oxygen-air delivery systems, polyethylene glycol, and simvastatin.    Review of patient's allergies indicates:  No Known Allergies     Objective       Hip Range of Motion   Right Hip   Active (deg) Passive (deg) Pain   Flexion 110       Extension         ABduction         ADduction         External Rotation 90/90         External Rotation Prone         Internal Rotation 90/90         Internal Rotation Prone              Left Hip   Active (deg) Passive (deg) Pain   Flexion 95       Extension         ABduction         ADduction         External Rotation 90/90         External Rotation Prone         Internal Rotation 90/90         Internal Rotation Prone                  Knee Range of Motion   Right Knee   Active (deg) Passive (deg) Pain   Flexion 130       Extension 0           Left Knee   Active (deg) Passive (deg) Pain   Flexion 100 105     Extension -7           Quad lag with terminal knee extension right   0   left -14     Ankle/Foot Range of Motion   Right Ankle/Foot   Active (deg) Passive (deg) Pain   Dorsiflexion (KE) 10       Dorsiflexion (KF)         Plantar Flexion         Ankle Inversion         Ankle Eversion         Subtalar Inversion         Subtalar Eversion         Great Toe MTP Flexion         Great Toe MTP Extension         Great Toe IP Flexion             Left Ankle/Foot   Active (deg) Passive (deg) Pain   Dorsiflexion (KE) 10       Dorsiflexion (KF)         Plantar Flexion         Ankle Inversion         Ankle Eversion         Subtalar Inversion         Subtalar Eversion         Great Toe MTP Flexion         Great Toe MTP Extension         Great Toe IP Flexion                            Hip Strength - Planes of Motion   Right Strength Right Pain Left Strength Left  Pain   Flexion (L2) 5   3+     Extension 5   3+     ABduction 5   3+     ADduction 5   3+     Internal Rotation 5   3+     External Rotation 5   3+         Knee Strength   Right Strength Right Pain Left Strength Left  Pain   Flexion (S2) 4   3     Prone Flexion 4   3     Extension (L3) 4   3            Ankle/Foot Strength - Planes of Motion   Right Strength Right Pain Left Strength Left  Pain   Dorsiflexion (L4) 5   5     Plantar Flexion (S1) 5   5     Inversion 5   5     Eversion 5   5     Great Toe Flexion 5   5     Great Toe Extension (L5) 5   5     Lesser Toes Flexion 5   5     Lesser Toes Extension 5   5               Treatment:  Therapeutic  Exercise  TE 1: pt perfromed home ex program      Time Entry(in minutes):       Assessment & Plan   Assessment  Tamera presents with a condition of Moderate complexity.   Presentation of Symptoms: Evolving  Will Comorbidities Impact Care: No       Functional Limitations: Activity tolerance, Disrupted sleep pattern, Squatting, Standing tolerance, Range of motion, Performing household chores, Participating in sports, Participating in leisure activities, Painful locomotion/ambulation, Pain with ADLs/IADLs, Getting off the floor, Functional mobility, Driving, Completing self-care activities, Completing work/school activities, Decreased ambulation distance/endurance  Impairments: Activity intolerance, Impaired balance, Lack of appropriate home exercise program, Impaired physical strength  Personal Factors Affecting Prognosis: Pain    Prognosis: Excellent  Assessment Details: Pt  2 weeks post op knee replacement left side with decreased flexion and extension. Pt has weak vmo and quads. Pt will benefit from PT to meet goals to regain strength and decreased pains .     Plan  From a physical therapy perspective, the patient would benefit from: Skilled Rehab Services    Planned therapy interventions include: Therapeutic exercise, Therapeutic activities, Neuromuscular re-education, and Manual therapy.    Planned modalities to include: Ultrasound and Electrical stimulation - passive/unattended.        Visit Frequency: 2 times Per Week for 8 Weeks.       This plan was discussed with Patient, Family, and Therapy assistant.   Discussion participants: Agreed Upon Plan of Care  Plan details:  Plan of care Certification: 5/6/2025 to 7/6/2025 . Outpatient Physical Therapy 2 times weekly for 8 weeks to include the following interventions: Electrical Stimulation nmes , Manual Therapy, Neuromuscular Re-ed, Patient Education, Therapeutic Activities, and Therapeutic Exercise.  Plan of care has been reestablished with Edith BA and  Leigha Willis, PT           Patient's spiritual, cultural, and educational needs considered and patient agreeable to plan of care and goals.     Education  Education was done with Patient. The patient's learning style includes Demonstration, Pictures/video, and Listening.                  Goals:   Active       long term goals        pt will ambulate without an assistive device pain free without a limp        Start:  05/06/25    Expected End:  07/06/25            pt will have 5/5 strength left knee        Start:  05/06/25    Expected End:  07/06/25            pt will be able to stoop and bend pain free , be able to return to yard work        Start:  05/06/25    Expected End:  07/06/25               short term goals        pt will be independent with home ex program        Start:  05/06/25    Expected End:  06/06/25            pt will be able to increase knee range of motion 0-130        Start:  05/06/25    Expected End:  06/06/25            decrease quad lag to 0 degrees        Start:  05/06/25    Expected End:  06/06/25            pt will return to driving        Start:  05/06/25    Expected End:  06/06/25                Ahmet Willis, PT          I CERTIFY THE NEED FOR THESE SERVICES FURNISHED UNDER THIS PLAN OF TREATMENT AND WHILE UNDER MY CARE.    Physician's comments:      Physician's Signature: ___________________________________________________

## 2025-05-09 ENCOUNTER — CLINICAL SUPPORT (OUTPATIENT)
Dept: REHABILITATION | Facility: HOSPITAL | Age: 65
End: 2025-05-09
Payer: MEDICARE

## 2025-05-09 DIAGNOSIS — R26.89 DECREASED FUNCTIONAL MOBILITY: ICD-10-CM

## 2025-05-09 DIAGNOSIS — M25.662 DECREASED RANGE OF MOTION (ROM) OF LEFT KNEE: ICD-10-CM

## 2025-05-09 DIAGNOSIS — Z96.652 HISTORY OF TOTAL KNEE ARTHROPLASTY, LEFT: Primary | ICD-10-CM

## 2025-05-09 PROCEDURE — 97112 NEUROMUSCULAR REEDUCATION: CPT | Mod: PN,CQ

## 2025-05-09 PROCEDURE — 97530 THERAPEUTIC ACTIVITIES: CPT | Mod: PN,CQ

## 2025-05-09 NOTE — PROGRESS NOTES
Outpatient Rehab    Physical Therapy Visit    Patient Name: Tamera Hernandez  MRN: [id number]  YOB: 1960  Encounter Date: 5/9/2025    Therapy Diagnosis:   Encounter Diagnoses   Name Primary?    History of total knee arthroplasty, left Yes    Decreased range of motion (ROM) of left knee     Decreased functional mobility      Physician: Aman Lange Jr., *    Physician Orders: Eval and Treat  Medical Diagnosis: History of total knee arthroplasty, left    Visit # / Visits Authorized:  1 / 16  Pta visit# 1  Insurance Authorization Period: 5/6/2025 to 7/6/2025    Time In:  155   Time Out: 235   Total Time (in minutes):     Total Billable Time (in minutes): 40     FOTO:  Intake Score:  %  Survey Score 2:  %  Survey Score 3:  %         Subjective   I'm doing ok, still stiff.  Pain reported as 5/10.      Objective       Knee Range of Motion   Left Knee   Active (deg) Passive (deg) Pain   Flexion 106       Extension -7           Quad lag with terminal knee extension right   0   left -14            Treatment:  Balance/Neuromuscular Re-Education  NMR 1: slant board x 2'  NMR 2: swissball knee flexion x 10  NMR 3: qs with es x 20  NMR 4: tke es x 20  Therapeutic Activity  TA 1: biking x 4' to promote functional mobility with walking  TA 2: ds squats tg x 20 to simulate bending and squatting  TA 3: ds calf raises tg x 15 to simulate reaching on tip toes  TA 4: ds leg presses 20 x 60# to simulate bending and squatting  TA 5: ds calf presses 15 x 60# to simulate reaching on tip toes    Time Entry(in minutes):       Assessment & Plan   Assessment:         Patient will continue to benefit from skilled outpatient physical therapy to address the deficits listed in the problem list box on initial evaluation, provide pt/family education and to maximize pt's level of independence in the home and community environment.     Patient's spiritual, cultural, and educational needs considered and patient agreeable to plan of  care and goals.           Plan:      Goals:   Active       long term goals        pt will ambulate without an assistive device pain free without a limp        Start:  05/06/25    Expected End:  07/06/25            pt will have 5/5 strength left knee        Start:  05/06/25    Expected End:  07/06/25            pt will be able to stoop and bend pain free , be able to return to yard work        Start:  05/06/25    Expected End:  07/06/25               short term goals        pt will be independent with home ex program        Start:  05/06/25    Expected End:  06/06/25            pt will be able to increase knee range of motion 0-130        Start:  05/06/25    Expected End:  06/06/25            decrease quad lag to 0 degrees        Start:  05/06/25    Expected End:  06/06/25            pt will return to driving        Start:  05/06/25    Expected End:  06/06/25            Plan of care Certification: 5/6/2025 to 7/6/2025 . Outpatient Physical Therapy 2 times weekly for 8 weeks to include the following interventions: Electrical Stimulation nmes , Manual Therapy, Neuromuscular Re-ed, Patient Education, Therapeutic Activities, and Therapeutic Exercise.     Fatoumata Greenwood, PTA

## 2025-05-12 ENCOUNTER — CLINICAL SUPPORT (OUTPATIENT)
Dept: REHABILITATION | Facility: HOSPITAL | Age: 65
End: 2025-05-12
Payer: MEDICARE

## 2025-05-12 DIAGNOSIS — R26.89 DECREASED FUNCTIONAL MOBILITY: ICD-10-CM

## 2025-05-12 DIAGNOSIS — Z96.652 HISTORY OF TOTAL KNEE ARTHROPLASTY, LEFT: Primary | ICD-10-CM

## 2025-05-12 DIAGNOSIS — M25.662 DECREASED RANGE OF MOTION (ROM) OF LEFT KNEE: ICD-10-CM

## 2025-05-12 PROCEDURE — 97530 THERAPEUTIC ACTIVITIES: CPT | Mod: PN,CQ

## 2025-05-12 PROCEDURE — 97112 NEUROMUSCULAR REEDUCATION: CPT | Mod: PN,CQ

## 2025-05-12 NOTE — PROGRESS NOTES
"  Outpatient Rehab    Physical Therapy Visit    Patient Name: Tamera Hernandez  MRN: [id number]  YOB: 1960  Encounter Date: 5/12/2025    Therapy Diagnosis:   Encounter Diagnoses   Name Primary?    History of total knee arthroplasty, left Yes    Decreased range of motion (ROM) of left knee     Decreased functional mobility      Physician: Aman Lange Jr., *    Physician Orders: Eval and Treat  Medical Diagnosis: History of total knee arthroplasty, left    Visit # / Visits Authorized:  2 / 16  Pta visit# 2  Insurance Authorization Period: 5/6/2025 to 7/6/2025  Date of Evaluation: 5/6/2025  Plan of Care Certification: 5/6/2025 to 7/6/2025        Time In: 110    Time Out:  155  Total Time (in minutes):     Total Billable Time (in minutes):  45    FOTO:  Intake Score:  %  Survey Score 2:  %  Survey Score 3:  %    Precautions:       Subjective   my right knee was sore after last time from the stretching, left knee is doing ok.  Pain reported as 3/10. pain med taken    Objective       Knee Range of Motion   Left Knee   Active (deg) Passive (deg) Pain   Flexion 108       Extension -7           Quad lag with terminal knee extension right   0   left -12           Treatment:  Balance/Neuromuscular Re-Education  NMR 1: slant board x 2'  NMR 2: swissball knee flexion x 10  NMR 3: qs with es x 20  NMR 4: tke es x 20  NMR 5: left sitting hs stretches x 5  Therapeutic Activity  TA 1: biking x 5' to promote functional mobility with walking  TA 2: ds squats tg x 20 to simulate bending and squatting  TA 3: ds calf raises tg x 15 to simulate reaching on tip toes  TA 4: ds leg presses 20 x 60# to simulate bending and squatting  TA 5: ds calf presses 15 x 60# to simulate reaching on tip toes  TA 6: left forward 6" x 10 to simulate community stair climbing    Time Entry(in minutes):       Assessment & Plan   Assessment:         Patient will continue to benefit from skilled outpatient physical therapy to address the " deficits listed in the problem list box on initial evaluation, provide pt/family education and to maximize pt's level of independence in the home and community environment.     Patient's spiritual, cultural, and educational needs considered and patient agreeable to plan of care and goals.           Plan:      Goals:   Active       long term goals        pt will ambulate without an assistive device pain free without a limp        Start:  05/06/25    Expected End:  07/06/25            pt will have 5/5 strength left knee        Start:  05/06/25    Expected End:  07/06/25            pt will be able to stoop and bend pain free , be able to return to yard work        Start:  05/06/25    Expected End:  07/06/25               short term goals        pt will be independent with home ex program        Start:  05/06/25    Expected End:  06/06/25            pt will be able to increase knee range of motion 0-130        Start:  05/06/25    Expected End:  06/06/25            decrease quad lag to 0 degrees        Start:  05/06/25    Expected End:  06/06/25            pt will return to driving        Start:  05/06/25    Expected End:  06/06/25            Plan of care Certification: 5/6/2025 to 7/6/2025 . Outpatient Physical Therapy 2 times weekly for 8 weeks to include the following interventions: Electrical Stimulation nmes , Manual Therapy, Neuromuscular Re-ed, Patient Education, Therapeutic Activities, and Therapeutic Exercise.       Fatoumata Greenwood, PTA

## 2025-05-15 ENCOUNTER — CLINICAL SUPPORT (OUTPATIENT)
Dept: REHABILITATION | Facility: HOSPITAL | Age: 65
End: 2025-05-15
Payer: MEDICARE

## 2025-05-15 DIAGNOSIS — Z96.652 HISTORY OF TOTAL KNEE ARTHROPLASTY, LEFT: Primary | ICD-10-CM

## 2025-05-15 DIAGNOSIS — M25.662 DECREASED RANGE OF MOTION (ROM) OF LEFT KNEE: ICD-10-CM

## 2025-05-15 DIAGNOSIS — R26.89 DECREASED FUNCTIONAL MOBILITY: ICD-10-CM

## 2025-05-15 PROCEDURE — 97112 NEUROMUSCULAR REEDUCATION: CPT | Mod: PN

## 2025-05-15 PROCEDURE — 97530 THERAPEUTIC ACTIVITIES: CPT | Mod: PN

## 2025-05-15 NOTE — PROGRESS NOTES
"  Outpatient Rehab    Physical Therapy Visit    Patient Name: Tamera Hernandez  MRN: [id number]  YOB: 1960  Encounter Date: 5/15/2025    Therapy Diagnosis:   Encounter Diagnoses   Name Primary?    History of total knee arthroplasty, left Yes    Decreased range of motion (ROM) of left knee     Decreased functional mobility      Physician: Aman Lange Jr., *    Physician Orders: Eval and Treat  Medical Diagnosis: History of total knee arthroplasty, left    Visit # / Visits Authorized:  3 / 16  Insurance Authorization Period: 5/6/2025 to 7/6/2025  Date of Evaluation: 5/6/2025  Plan of Care Certification: 5/6/2025 to 7/6/2025      PT/PTA:     Number of PTA visits since last PT visit:   Time In: 1100   Time Out: 1145  Total Time (in minutes): 45   Total Billable Time (in minutes): 45    FOTO:  Intake Score:  %  Survey Score 2:  %  Survey Score 3:  %    Precautions:       Subjective   knee is getting better every day.  Pain reported as 2/10.      Objective       Knee Range of Motion   Left Knee   Active (deg) Passive (deg) Pain   Flexion 115 120     Extension -3           Quad lag with terminal knee extension right   0   left -8           Treatment:  Manual Therapy  MT 1: prom x 5 min to knee in flexion  Balance/Neuromuscular Re-Education  NMR 1: slant board x 2'  NMR 2: swissball knee flexion x 10  NMR 3: qs with es x 20  NMR 4: tke es x 20  NMR 5: left sitting hs stretches x 5  NMR 6: isometric wall tke x 20 reps  Therapeutic Activity  TA 1: biking x 5' to promote functional mobility with walking  TA 2: ds squats tg x 20 to simulate bending and squatting  TA 3: ds calf raises tg x 15 to simulate reaching on tip toes  TA 4: ds leg presses 20 x 60# to simulate bending and squatting  TA 5: ds calf presses 15 x 60# to simulate reaching on tip toes  TA 6: left forward 6" x 10 to simulate community stair climbing    Time Entry(in minutes):       Assessment & Plan   Assessment:         Patient will continue " to benefit from skilled outpatient physical therapy to address the deficits listed in the problem list box on initial evaluation, provide pt/family education and to maximize pt's level of independence in the home and community environment.     Patient's spiritual, cultural, and educational needs considered and patient agreeable to plan of care and goals.           Plan:      Goals:   Active       long term goals        pt will ambulate without an assistive device pain free without a limp        Start:  05/06/25    Expected End:  07/06/25            pt will have 5/5 strength left knee        Start:  05/06/25    Expected End:  07/06/25            pt will be able to stoop and bend pain free , be able to return to yard work        Start:  05/06/25    Expected End:  07/06/25               short term goals        pt will be independent with home ex program        Start:  05/06/25    Expected End:  06/06/25            pt will be able to increase knee range of motion 0-130        Start:  05/06/25    Expected End:  06/06/25            decrease quad lag to 0 degrees        Start:  05/06/25    Expected End:  06/06/25            pt will return to driving        Start:  05/06/25    Expected End:  06/06/25              Plan details:  Plan of care Certification: 5/6/2025 to 7/6/2025 . Outpatient Physical Therapy 2 times weekly for 8 weeks to include the following interventions: Electrical Stimulation nmes , Manual Therapy, Neuromuscular Re-ed, Patient Education, Therapeutic Activities, and Therapeutic Exercise.  Plan of care has been reestablished with Edith BA and Leigha Willis, PT           Ahmet Willis, PT

## 2025-05-19 ENCOUNTER — CLINICAL SUPPORT (OUTPATIENT)
Dept: REHABILITATION | Facility: HOSPITAL | Age: 65
End: 2025-05-19
Payer: MEDICARE

## 2025-05-19 DIAGNOSIS — Z96.652 HISTORY OF TOTAL KNEE ARTHROPLASTY, LEFT: Primary | ICD-10-CM

## 2025-05-19 DIAGNOSIS — R26.89 DECREASED FUNCTIONAL MOBILITY: ICD-10-CM

## 2025-05-19 DIAGNOSIS — M25.662 DECREASED RANGE OF MOTION (ROM) OF LEFT KNEE: ICD-10-CM

## 2025-05-19 PROCEDURE — 97530 THERAPEUTIC ACTIVITIES: CPT | Mod: PN,CQ

## 2025-05-19 PROCEDURE — 97112 NEUROMUSCULAR REEDUCATION: CPT | Mod: PN,CQ

## 2025-05-19 NOTE — PROGRESS NOTES
Outpatient Rehab    Physical Therapy Visit    Patient Name: Tamera Hernandez  MRN: [id number]  YOB: 1960  Encounter Date: 5/19/2025    Therapy Diagnosis:   Encounter Diagnoses   Name Primary?    History of total knee arthroplasty, left Yes    Decreased range of motion (ROM) of left knee     Decreased functional mobility      Physician: Aman Lange Jr., *    Physician Orders: Eval and Treat  Medical Diagnosis: History of total knee arthroplasty, left    Visit # / Visits Authorized:  4 / 16  Pta visit# 1  Insurance Authorization Period: 5/6/2025 to 7/6/2025  Date of Evaluation: 5/6/2025  Plan of Care Certification: 5/6/2025 to 7/6/2025     Time In:   110  Time Out:  155  Total Time (in minutes):     Total Billable Time (in minutes):  45    FOTO:  Intake Score:  %  Survey Score 2: 54 %  Survey Score 3:  %    Precautions:       Subjective   I had some sharp shooting pain in knee Friday, but it passed and I was ok the rest of the weekend.  Pain reported as 2/10.      Objective       Knee Range of Motion   Left Knee   Active (deg) Passive (deg) Pain   Flexion 115       Extension -4           Quad lag with terminal knee extension    left -8           Treatment:  Balance/Neuromuscular Re-Education  NMR 1: slant board x 2'  NMR 2: towel knee flexion stretch x 5  NMR 3: qs with es x 20  NMR 4: tke es x 20  NMR 5: left sitting hs stretches x 5  Therapeutic Activity  TA 1: biking x 5' to promote functional mobility with walking  TA 2: ds squats tg x 20 to simulate bending and squatting  TA 3: ds calf raises tg x 15 to simulate reaching on tip toes  TA 4: left ss leg presses 20 x 50# to simulate bending and squatting  TA 5: ds calf presses 15 x 60# to simulate reaching on tip toes  TA 6: gail hip abd add 10 x 20# to promote pelvic stability with walking    Time Entry(in minutes):       Assessment & Plan   Assessment:         Patient will continue to benefit from skilled outpatient physical therapy to address  the deficits listed in the problem list box on initial evaluation, provide pt/family education and to maximize pt's level of independence in the home and community environment.     Patient's spiritual, cultural, and educational needs considered and patient agreeable to plan of care and goals.           Plan:      Goals:   Active       long term goals        pt will ambulate without an assistive device pain free without a limp        Start:  05/06/25    Expected End:  07/06/25            pt will have 5/5 strength left knee        Start:  05/06/25    Expected End:  07/06/25            pt will be able to stoop and bend pain free , be able to return to yard work        Start:  05/06/25    Expected End:  07/06/25               short term goals        pt will be independent with home ex program        Start:  05/06/25    Expected End:  06/06/25            pt will be able to increase knee range of motion 0-130        Start:  05/06/25    Expected End:  06/06/25            decrease quad lag to 0 degrees        Start:  05/06/25    Expected End:  06/06/25            pt will return to driving        Start:  05/06/25    Expected End:  06/06/25               Plan details:  Plan of care Certification: 5/6/2025 to 7/6/2025 . Outpatient Physical Therapy 2 times weekly for 8 weeks to include the following interventions: Electrical Stimulation nmes , Manual Therapy, Neuromuscular Re-ed, Patient Education, Therapeutic Activities, and Therapeutic Exercise.     Fatoumata Greenwood, PTA

## 2025-05-22 ENCOUNTER — CLINICAL SUPPORT (OUTPATIENT)
Dept: REHABILITATION | Facility: HOSPITAL | Age: 65
End: 2025-05-22
Payer: MEDICARE

## 2025-05-22 DIAGNOSIS — Z96.652 HISTORY OF TOTAL KNEE ARTHROPLASTY, LEFT: Primary | ICD-10-CM

## 2025-05-22 DIAGNOSIS — R26.89 DECREASED FUNCTIONAL MOBILITY: ICD-10-CM

## 2025-05-22 DIAGNOSIS — M25.662 DECREASED RANGE OF MOTION (ROM) OF LEFT KNEE: ICD-10-CM

## 2025-05-22 PROCEDURE — 97530 THERAPEUTIC ACTIVITIES: CPT | Mod: PN,CQ

## 2025-05-22 PROCEDURE — 97112 NEUROMUSCULAR REEDUCATION: CPT | Mod: PN,CQ

## 2025-05-22 NOTE — PROGRESS NOTES
Outpatient Rehab    Physical Therapy Visit    Patient Name: Tamera Hernandez  MRN: [id number]  YOB: 1960  Encounter Date: 5/22/2025    Therapy Diagnosis:   Encounter Diagnoses   Name Primary?    History of total knee arthroplasty, left Yes    Decreased range of motion (ROM) of left knee     Decreased functional mobility      Physician: Aman aLnge Jr., *    Physician Orders: Eval and Treat  Medical Diagnosis: History of total knee arthroplasty, left    Visit # / Visits Authorized:  5 / 15  Pta visit# 2  Insurance Authorization Period: 5/6/2025 to 7/6/2025  Date of Evaluation: 5/6/2025  Plan of Care Certification: 5/6/2025 to 7/6/2025        Time In: 115    Time Out:  200  Total Time (in minutes):     Total Billable Time (in minutes):  45    FOTO:  Intake Score:  %  Survey Score 2:  %  Survey Score 3:  %    Precautions:       Subjective   I'm doing good, I do think my right leg is shorter than my left which makes me walk odd.  Pain reported as 2/10.      Objective       Knee Range of Motion   Left Knee   Active (deg) Passive (deg) Pain   Flexion 120       Extension -5           Quad lag with terminal knee extension    left -8           Treatment:  Balance/Neuromuscular Re-Education  NMR 1: slant board x 2'  NMR 2: towel knee flexion stretch x 5  NMR 3: qs with es x 20  NMR 4: tke es x 20  Therapeutic Activity  TA 1: biking x 5' to promote functional mobility with walking  TA 2: ds squats tg x 20 to simulate bending and squatting  TA 4: left ss leg presses 20 x 50# to simulate bending and squatting  TA 5: ds calf presses 15 x 60# to simulate reaching on tip toes  TA 6: gail hip abd add 10 x 20# to promote pelvic stability with walking    Time Entry(in minutes):       Assessment & Plan   Assessment:         The patient will continue to benefit from skilled outpatient physical therapy in order to address the deficits listed in the problem list on the initial evaluation, provide patient and family  education, and maximize the patients level of independence in the home and community environments.     The patient's spiritual, cultural, and educational needs were considered, and the patient is agreeable to the plan of care and goals.           Plan:      Goals:   Active       long term goals        pt will ambulate without an assistive device pain free without a limp        Start:  05/06/25    Expected End:  07/06/25            pt will have 5/5 strength left knee        Start:  05/06/25    Expected End:  07/06/25            pt will be able to stoop and bend pain free , be able to return to yard work        Start:  05/06/25    Expected End:  07/06/25               short term goals        pt will be independent with home ex program        Start:  05/06/25    Expected End:  06/06/25            pt will be able to increase knee range of motion 0-130        Start:  05/06/25    Expected End:  06/06/25            decrease quad lag to 0 degrees        Start:  05/06/25    Expected End:  06/06/25            pt will return to driving        Start:  05/06/25    Expected End:  06/06/25               Plan details:  Plan of care Certification: 5/6/2025 to 7/6/2025 . Outpatient Physical Therapy 2 times weekly for 8 weeks to include the following interventions: Electrical Stimulation nmes , Manual Therapy, Neuromuscular Re-ed, Patient Education, Therapeutic Activities, and Therapeutic Exercise.    Fatoumata Greenwood, PTA

## 2025-05-27 ENCOUNTER — CLINICAL SUPPORT (OUTPATIENT)
Dept: REHABILITATION | Facility: HOSPITAL | Age: 65
End: 2025-05-27
Payer: MEDICARE

## 2025-05-27 DIAGNOSIS — M25.662 DECREASED RANGE OF MOTION (ROM) OF LEFT KNEE: ICD-10-CM

## 2025-05-27 DIAGNOSIS — Z96.652 HISTORY OF TOTAL KNEE ARTHROPLASTY, LEFT: Primary | ICD-10-CM

## 2025-05-27 DIAGNOSIS — R26.89 DECREASED FUNCTIONAL MOBILITY: ICD-10-CM

## 2025-05-27 PROCEDURE — 97530 THERAPEUTIC ACTIVITIES: CPT | Mod: PN,CQ

## 2025-05-27 PROCEDURE — 97112 NEUROMUSCULAR REEDUCATION: CPT | Mod: PN,CQ

## 2025-05-27 NOTE — PROGRESS NOTES
"  Outpatient Rehab    Physical Therapy Visit    Patient Name: Tamera Hernandez  MRN: [id number]  YOB: 1960  Encounter Date: 5/27/2025    Therapy Diagnosis:   Encounter Diagnoses   Name Primary?    History of total knee arthroplasty, left Yes    Decreased range of motion (ROM) of left knee     Decreased functional mobility      Physician: Aman Lange Jr., *    Physician Orders: Eval and Treat  Medical Diagnosis: History of total knee arthroplasty, left    Visit # / Visits Authorized:  6 / 15  Pta visit# 3  Insurance Authorization Period: 5/6/2025 to 7/6/2025  Date of Evaluation: 5/6/2025  Plan of Care Certification: 5/6/2025 to 7/6/2025      Time In:   112  Time Out: 155   Total Time (in minutes):     Total Billable Time (in minutes): 43     FOTO:  Intake Score:  %  Survey Score 2:  %  Survey Score 3:  %    Precautions:       Subjective   I'm ok today. I can now get up off the low toilet seat at my house..  Pain reported as 2/10.      Objective       Knee Range of Motion   Left Knee   Active (deg) Passive (deg) Pain   Flexion 125       Extension -5           Quad lag with terminal knee extension    left -8           Treatment:  Balance/Neuromuscular Re-Education  NMR 1: slant board x 2'  NMR 3: qs with es x 20  NMR 4: tke es 15 x 3#  NMR 5: left  hs stretches on step  x 5  NMR 6: supine towel flexion stretch x 8  NMR 7: forward step downs onto r le 6" x 6  NMR 8: 6" leg lifts x 6 with es  Therapeutic Activity  TA 1: biking x 5' to promote functional mobility with walking  TA 2: ds squats tg x 20 to simulate bending and squatting  TA 3: ds calf raises tg x 20 to simulate reaching on tip toes  TA 4: left ss leg presses 20 x 50# to simulate bending and squatting  TA 5: ds calf presses 15 x 60# to simulate reaching on tip toes    Time Entry(in minutes):  Neuromuscular Re-Education Time Entry: 24  Therapeutic Activity Time Entry: 19    Assessment & Plan   Assessment: pt had +5 gain with knee flexion " this session  Evaluation/Treatment Tolerance: Patient tolerated treatment well    The patient will continue to benefit from skilled outpatient physical therapy in order to address the deficits listed in the problem list on the initial evaluation, provide patient and family education, and maximize the patients level of independence in the home and community environments.     The patient's spiritual, cultural, and educational needs were considered, and the patient is agreeable to the plan of care and goals.           Plan:      Goals:   Active       long term goals        pt will ambulate without an assistive device pain free without a limp  (Met)       Start:  05/06/25    Expected End:  07/06/25    Resolved:  05/27/25         pt will have 5/5 strength left knee        Start:  05/06/25    Expected End:  07/06/25            pt will be able to stoop and bend pain free , be able to return to yard work        Start:  05/06/25    Expected End:  07/06/25               short term goals        pt will be independent with home ex program  (Met)       Start:  05/06/25    Expected End:  06/06/25    Resolved:  05/27/25         pt will be able to increase knee range of motion 0-130        Start:  05/06/25    Expected End:  06/06/25            decrease quad lag to 0 degrees        Start:  05/06/25    Expected End:  06/06/25            pt will return to driving  (Met)       Start:  05/06/25    Expected End:  06/06/25    Resolved:  05/27/25         Plan of care Certification: 5/6/2025 to 7/6/2025 . Outpatient Physical Therapy 2 times weekly for 8 weeks to include the following interventions: Electrical Stimulation nmes , Manual Therapy, Neuromuscular Re-ed, Patient Education, Therapeutic Activities, and Therapeutic Exercise.     Fatoumata Greenwood, PTA

## 2025-05-29 ENCOUNTER — CLINICAL SUPPORT (OUTPATIENT)
Dept: REHABILITATION | Facility: HOSPITAL | Age: 65
End: 2025-05-29
Payer: MEDICARE

## 2025-05-29 DIAGNOSIS — R26.89 DECREASED FUNCTIONAL MOBILITY: ICD-10-CM

## 2025-05-29 DIAGNOSIS — M25.662 DECREASED RANGE OF MOTION (ROM) OF LEFT KNEE: ICD-10-CM

## 2025-05-29 DIAGNOSIS — Z74.09 DECREASED FUNCTIONAL MOBILITY AND ENDURANCE: ICD-10-CM

## 2025-05-29 DIAGNOSIS — Z96.652 HISTORY OF TOTAL KNEE ARTHROPLASTY, LEFT: Primary | ICD-10-CM

## 2025-05-29 PROCEDURE — 97530 THERAPEUTIC ACTIVITIES: CPT | Mod: PN

## 2025-05-29 PROCEDURE — 97112 NEUROMUSCULAR REEDUCATION: CPT | Mod: PN

## 2025-05-29 NOTE — PROGRESS NOTES
"  Outpatient Rehab    Physical Therapy Visit    Patient Name: Tamera Hernandez  MRN: [id number]  YOB: 1960  Encounter Date: 5/29/2025    Therapy Diagnosis:   Encounter Diagnoses   Name Primary?    History of total knee arthroplasty, left Yes    Decreased range of motion (ROM) of left knee     Decreased functional mobility     Decreased functional mobility and endurance      Physician: Aman Lange Jr., *    Physician Orders: Eval and Treat  Medical Diagnosis: History of total knee arthroplasty, left    Visit # / Visits Authorized:  7 / 15  Insurance Authorization Period: 5/6/2025 to 7/6/2025  Date of Evaluation: 5/6/2025  Plan of Care Certification: 5/6/2025 to 7/6/2025      PT/PTA:     Number of PTA visits since last PT visit:   Time In: 1313   Time Out: 1400  Total Time (in minutes): 47   Total Billable Time (in minutes): 47    FOTO:  Intake Score:  %  Survey Score 2:  %  Survey Score 3:  %    Precautions:       Subjective   dong better every day . walking much better.  Pain reported as 1/10.      Objective       Knee Range of Motion   Left Knee   Active (deg) Passive (deg) Pain   Flexion 125       Extension -3           Quad lag with terminal knee extension    left -6           Treatment:  Manual Therapy  MT 1: prom x 5 min to knee in flexion  Balance/Neuromuscular Re-Education  NMR 1: slant board x 2'  NMR 2: towel knee flexion stretch x 5  NMR 3: qs with es x 20  NMR 4: tke es 15 x 3#  NMR 5: left  hs stretches on step  x 5  NMR 6: supine towel flexion stretch x 8  NMR 7: forward step downs onto r le 6" x 6  NMR 8: 6" leg lifts x 6 with es  Therapeutic Activity  TA 1: biking x 5' to promote functional mobility with walking  TA 2: ds squats tg x 20 to simulate bending and squatting  TA 3: ds calf raises tg x 20 to simulate reaching on tip toes  TA 4: left ss leg presses 20 x 50# to simulate bending and squatting  TA 5: ds calf presses 15 x 60# to simulate reaching on tip toes  TA 6: gail hip abd " add 10 x 20# to promote pelvic stability with walking    Time Entry(in minutes):       Assessment & Plan   Assessment: pt improving with rom and strength       The patient will continue to benefit from skilled outpatient physical therapy in order to address the deficits listed in the problem list on the initial evaluation, provide patient and family education, and maximize the patients level of independence in the home and community environments.     The patient's spiritual, cultural, and educational needs were considered, and the patient is agreeable to the plan of care and goals.           Goals:   Active       long term goals        pt will ambulate without an assistive device pain free without a limp  (Met)       Start:  05/06/25    Expected End:  07/06/25    Resolved:  05/27/25         pt will have 5/5 strength left knee        Start:  05/06/25    Expected End:  07/06/25            pt will be able to stoop and bend pain free , be able to return to yard work        Start:  05/06/25    Expected End:  07/06/25               short term goals        pt will be independent with home ex program  (Met)       Start:  05/06/25    Expected End:  06/06/25    Resolved:  05/27/25         pt will be able to increase knee range of motion 0-130        Start:  05/06/25    Expected End:  06/06/25            decrease quad lag to 0 degrees        Start:  05/06/25    Expected End:  06/06/25            pt will return to driving  (Met)       Start:  05/06/25    Expected End:  06/06/25    Resolved:  05/27/25                      Plan of care Certification: 5/6/2025 to 7/6/2025 . Outpatient Physical Therapy 2 times weekly for 8 weeks to include the following interventions: Electrical Stimulation nmes , Manual Therapy, Neuromuscular Re-ed, Patient Education, Therapeutic Activities, and Therapeutic Exercise.   Ahmet Willis, PT

## 2025-06-02 ENCOUNTER — CLINICAL SUPPORT (OUTPATIENT)
Dept: REHABILITATION | Facility: HOSPITAL | Age: 65
End: 2025-06-02
Payer: MEDICARE

## 2025-06-02 DIAGNOSIS — Z96.652 HISTORY OF TOTAL KNEE ARTHROPLASTY, LEFT: Primary | ICD-10-CM

## 2025-06-02 PROCEDURE — 97112 NEUROMUSCULAR REEDUCATION: CPT | Mod: PN,CQ

## 2025-06-02 PROCEDURE — 97530 THERAPEUTIC ACTIVITIES: CPT | Mod: PN,CQ

## 2025-06-06 ENCOUNTER — CLINICAL SUPPORT (OUTPATIENT)
Dept: REHABILITATION | Facility: HOSPITAL | Age: 65
End: 2025-06-06
Payer: MEDICARE

## 2025-06-06 DIAGNOSIS — M25.662 DECREASED RANGE OF MOTION (ROM) OF LEFT KNEE: ICD-10-CM

## 2025-06-06 DIAGNOSIS — R26.89 DECREASED FUNCTIONAL MOBILITY: ICD-10-CM

## 2025-06-06 DIAGNOSIS — Z96.652 HISTORY OF TOTAL KNEE ARTHROPLASTY, LEFT: Primary | ICD-10-CM

## 2025-06-06 PROCEDURE — 97112 NEUROMUSCULAR REEDUCATION: CPT | Mod: PN,CQ

## 2025-06-06 PROCEDURE — 97530 THERAPEUTIC ACTIVITIES: CPT | Mod: PN,CQ

## 2025-06-09 ENCOUNTER — CLINICAL SUPPORT (OUTPATIENT)
Dept: REHABILITATION | Facility: HOSPITAL | Age: 65
End: 2025-06-09
Payer: MEDICARE

## 2025-06-09 DIAGNOSIS — Z74.09 DECREASED FUNCTIONAL MOBILITY AND ENDURANCE: ICD-10-CM

## 2025-06-09 DIAGNOSIS — M25.662 DECREASED RANGE OF MOTION (ROM) OF LEFT KNEE: ICD-10-CM

## 2025-06-09 DIAGNOSIS — R26.89 DECREASED FUNCTIONAL MOBILITY: ICD-10-CM

## 2025-06-09 DIAGNOSIS — Z96.652 HISTORY OF TOTAL KNEE ARTHROPLASTY, LEFT: Primary | ICD-10-CM

## 2025-06-09 PROCEDURE — 97530 THERAPEUTIC ACTIVITIES: CPT | Mod: PN

## 2025-06-09 PROCEDURE — 97112 NEUROMUSCULAR REEDUCATION: CPT | Mod: PN

## 2025-06-09 NOTE — PROGRESS NOTES
"  Outpatient Rehab    Physical Therapy Visit    Patient Name: Tamera Hernandez  MRN: [id number]  YOB: 1960  Encounter Date: 6/9/2025    Therapy Diagnosis:   Encounter Diagnoses   Name Primary?    History of total knee arthroplasty, left Yes    Decreased range of motion (ROM) of left knee     Decreased functional mobility     Decreased functional mobility and endurance      Physician: Aman aLnge Jr., *    Physician Orders: Eval and Treat  Medical Diagnosis: History of total knee arthroplasty, left  Surgical Diagnosis: Not applicable for this Episode   Surgical Date: Not applicable for this Episode    Visit # / Visits Authorized:  10 / 15  Insurance Authorization Period: 5/6/2025 to 7/6/2025  Date of Evaluation: 5/6/2025  Plan of Care Certification: 5/6/2025 to 7/6/2025      PT/PTA:     Number of PTA visits since last PT visit:   Time In: 1314   Time Out: 1350  Total Time (in minutes): 36   Total Billable Time (in minutes): 36    FOTO:  Intake Score:  %  Survey Score 2:  %  Survey Score 3:  %    Precautions:       Subjective   gettling better slowly , stiffer in morning.  Pain reported as 0/10.      Objective       Knee Range of Motion   Left Knee   Active (deg) Passive (deg) Pain   Flexion 135       Extension -3           Quad lag with terminal knee extension    left -3           Treatment:  Manual Therapy  MT 1: prom x 5 min to knee in flexion  Balance/Neuromuscular Re-Education  NMR 1: slant board x 2'  NMR 2: towel knee flexion stretch x 5  NMR 3: qs with es x 20  NMR 4: tke es 15 x 5#  NMR 5: left  hs stretches on step  x 5  NMR 6: supine towel flexion stretch x 8  NMR 7: lateral step downs onto r le 6" x 10  NMR 8: 6" leg lifts x 6 with es  Therapeutic Activity  TA 1: biking x 5' to promote functional mobility with walking  TA 2: ds squats tg x 20 to simulate bending and squatting  TA 3: ds calf raises tg x 20 to simulate reaching on tip toes  TA 4: left ss leg presses 20 x 55# to simulate " bending and squatting  TA 5: ds calf presses 15 x 55# to simulate reaching on tip toes  TA 6: gail hip abd add 10 x 30# to promote pelvic stability with walking    Time Entry(in minutes):       Assessment & Plan   Assessment: pt cont to improve with rom and strength .       The patient will continue to benefit from skilled outpatient physical therapy in order to address the deficits listed in the problem list on the initial evaluation, provide patient and family education, and maximize the patients level of independence in the home and community environments.     The patient's spiritual, cultural, and educational needs were considered, and the patient is agreeable to the plan of care and goals.           Plan:      Goals:   Active       long term goals        pt will ambulate without an assistive device pain free without a limp  (Met)       Start:  05/06/25    Expected End:  07/06/25    Resolved:  05/27/25         pt will have 5/5 strength left knee        Start:  05/06/25    Expected End:  07/06/25            pt will be able to stoop and bend pain free , be able to return to yard work        Start:  05/06/25    Expected End:  07/06/25               short term goals        pt will be independent with home ex program  (Met)       Start:  05/06/25    Expected End:  06/06/25    Resolved:  05/27/25         pt will be able to increase knee range of motion 0-130        Start:  05/06/25    Expected End:  06/06/25            decrease quad lag to 0 degrees        Start:  05/06/25    Expected End:  06/06/25            pt will return to driving  (Met)       Start:  05/06/25    Expected End:  06/06/25    Resolved:  05/27/25           Plan of care Certification: 5/6/2025 to 7/6/2025 . Outpatient Physical Therapy 2 times weekly for 8 weeks to include the following interventions: Electrical Stimulation nmes , Manual Therapy, Neuromuscular Re-ed, Patient Education, Therapeutic Activities, and Therapeutic Exercise.   Ahmet Willis,  PT     Ahmet Willis, PT

## 2025-06-12 ENCOUNTER — CLINICAL SUPPORT (OUTPATIENT)
Dept: REHABILITATION | Facility: HOSPITAL | Age: 65
End: 2025-06-12
Payer: MEDICARE

## 2025-06-12 DIAGNOSIS — Z96.652 HISTORY OF TOTAL KNEE ARTHROPLASTY, LEFT: Primary | ICD-10-CM

## 2025-06-12 DIAGNOSIS — M25.662 DECREASED RANGE OF MOTION (ROM) OF LEFT KNEE: ICD-10-CM

## 2025-06-12 DIAGNOSIS — R26.89 DECREASED FUNCTIONAL MOBILITY: ICD-10-CM

## 2025-06-12 PROCEDURE — 97530 THERAPEUTIC ACTIVITIES: CPT | Mod: PN,CQ

## 2025-06-12 PROCEDURE — 97112 NEUROMUSCULAR REEDUCATION: CPT | Mod: PN,CQ

## 2025-06-12 NOTE — PROGRESS NOTES
Outpatient Rehab    Physical Therapy Discharge    Patient Name: Tamera Hernandez  MRN: [id number]  YOB: 1960  Encounter Date: 6/12/2025    Therapy Diagnosis:   Encounter Diagnoses   Name Primary?    History of total knee arthroplasty, left Yes    Decreased range of motion (ROM) of left knee     Decreased functional mobility      Physician: Aman Lange Jr., *    Physician Orders: Eval and Treat  Medical Diagnosis: History of total knee arthroplasty, left  Surgical Diagnosis: left post op knee arthroplasty   Surgical Date: 4/21/2025    Visit # / Visits Authorized:  11 / 15  Insurance Authorization Period: 5/6/2025 to 7/6/2025  Date of Evaluation: 5/6/2025  Plan of Care Certification: 5/6/2025 to 7/6/2025      PT/PTA:     Number of PTA visits since last PT visit:   Time In:     Time Out:    Total Time (in minutes):     Total Billable Time (in minutes):      FOTO:  Intake Score:  %  Survey Score 2:  %  Survey Score 3:  %    Precautions:       Subjective             Objective       Knee Range of Motion   Left Knee   Active (deg) Passive (deg) Pain   Flexion         Extension                         Treatment:  Balance/Neuromuscular Re-Education     Therapeutic Activity       Time Entry(in minutes):       Assessment & Plan   Assessment:         The patient's spiritual, cultural, and educational needs were considered, and the patient is agreeable to the plan of care and goals.           Plan:      Goals:   Resolved       long term goals        pt will ambulate without an assistive device pain free without a limp  (Met)       Start:  05/06/25    Expected End:  07/06/25    Resolved:  05/27/25         pt will have 5/5 strength left knee  (Met)       Start:  05/06/25    Expected End:  07/06/25    Resolved:  06/12/25         pt will be able to stoop and bend pain free , be able to return to yard work  (Met)       Start:  05/06/25    Expected End:  07/06/25    Resolved:  06/12/25            short term goals         pt will be independent with home ex program  (Met)       Start:  05/06/25    Expected End:  06/06/25    Resolved:  05/27/25         pt will be able to increase knee range of motion 0-130  (Met)       Start:  05/06/25    Expected End:  06/06/25    Resolved:  06/12/25         decrease quad lag to 0 degrees  (Met)       Start:  05/06/25    Expected End:  06/06/25    Resolved:  06/12/25         pt will return to driving  (Met)       Start:  05/06/25    Expected End:  06/06/25    Resolved:  05/27/25          Pt met all goals has 0-135 full range of motion .  Will d/c     Ahmet Willis, PT

## 2025-06-12 NOTE — PROGRESS NOTES
"  Outpatient Rehab    Physical Therapy Visit    Patient Name: Tamera Hernandez  MRN: [id number]  YOB: 1960  Encounter Date: 6/12/2025    Therapy Diagnosis:   Encounter Diagnoses   Name Primary?    History of total knee arthroplasty, left Yes    Decreased range of motion (ROM) of left knee     Decreased functional mobility      Physician: Aman Lange Jr., *    Physician Orders: Eval and Treat  Medical Diagnosis: History of total knee arthroplasty, left  Surgical Diagnosis: left post op knee arthroplasty   Surgical Date: 4/21/2025    Visit # / Visits Authorized:  11 / 15  Pta visit# 1  Insurance Authorization Period: 5/6/2025 to 7/6/2025  Date of Evaluation: 5/6/2025  Plan of Care Certification: 5/6/2025 to 7/6/2025     Time In:   108  Time Out: 146   Total Time (in minutes):     Total Billable Time (in minutes):  38    FOTO:  Intake Score:  %  Survey Score 2:  %  Survey Score 3:  %    Precautions:       Subjective   I was able to get out in the yard and do yardwork.  Pain reported as 0/10.      Objective       Knee Range of Motion   Left Knee   Active (deg) Passive (deg) Pain   Flexion 135       Extension 0           Quad lag with terminal knee extension    left 0           Treatment:  Balance/Neuromuscular Re-Education  NMR 1: slant board x 2'  NMR 2: towel knee flexion stretch x 5  NMR 4: tke es 15 x 5#  NMR 5: left  hs stretches on step  x 5  NMR 7: forward step downs 6" onto right le x 10  Therapeutic Activity  TA 1: biking x 5' to promote functional mobility with walking  TA 2: ds squats tg x 20 to simulate bending and squatting  TA 3: ds calf raises tg x 20 to simulate reaching on tip toes  TA 4: left ss leg presses 20 x 55# to simulate bending and squatting  TA 5: ds calf presses 15 x 55# to simulate reaching on tip toes  TA 6: gail hip abd add 10 x 30# to promote pelvic stability with walking    Time Entry(in minutes):       Assessment & Plan   Assessment:         The patient will continue " to benefit from skilled outpatient physical therapy in order to address the deficits listed in the problem list on the initial evaluation, provide patient and family education, and maximize the patients level of independence in the home and community environments.     The patient's spiritual, cultural, and educational needs were considered, and the patient is agreeable to the plan of care and goals.           Plan:      Goals:   Active       long term goals        pt will ambulate without an assistive device pain free without a limp  (Met)       Start:  05/06/25    Expected End:  07/06/25    Resolved:  05/27/25         pt will have 5/5 strength left knee        Start:  05/06/25    Expected End:  07/06/25            pt will be able to stoop and bend pain free , be able to return to yard work        Start:  05/06/25    Expected End:  07/06/25               short term goals        pt will be independent with home ex program  (Met)       Start:  05/06/25    Expected End:  06/06/25    Resolved:  05/27/25         pt will be able to increase knee range of motion 0-130        Start:  05/06/25    Expected End:  06/06/25            decrease quad lag to 0 degrees        Start:  05/06/25    Expected End:  06/06/25            pt will return to driving  (Met)       Start:  05/06/25    Expected End:  06/06/25    Resolved:  05/27/25         Will d/c to home exercise program, see physical therapist d/c note    Fatoumata Greenwood, PTA

## 2025-06-30 ENCOUNTER — HOSPITAL ENCOUNTER (OUTPATIENT)
Dept: RADIOLOGY | Facility: HOSPITAL | Age: 65
Discharge: HOME | End: 2025-06-30
Attending: NURSE PRACTITIONER
Payer: MEDICARE

## 2025-06-30 VITALS — BODY MASS INDEX: 32.49 KG/M2 | WEIGHT: 195 LBS | HEIGHT: 65 IN

## 2025-06-30 DIAGNOSIS — Z12.31 VISIT FOR SCREENING MAMMOGRAM: ICD-10-CM

## 2025-06-30 PROCEDURE — 77067 SCR MAMMO BI INCL CAD: CPT | Mod: 26,,, | Performed by: RADIOLOGY

## 2025-06-30 PROCEDURE — 77063 BREAST TOMOSYNTHESIS BI: CPT | Mod: 26,,, | Performed by: RADIOLOGY

## 2025-06-30 PROCEDURE — 77063 BREAST TOMOSYNTHESIS BI: CPT | Mod: TC

## 2025-07-07 ENCOUNTER — OFFICE VISIT (OUTPATIENT)
Dept: NEPHROLOGY | Facility: CLINIC | Age: 65
End: 2025-07-07
Payer: MEDICARE

## 2025-07-07 VITALS
HEIGHT: 65 IN | RESPIRATION RATE: 18 BRPM | WEIGHT: 201.63 LBS | DIASTOLIC BLOOD PRESSURE: 84 MMHG | OXYGEN SATURATION: 100 % | BODY MASS INDEX: 33.59 KG/M2 | SYSTOLIC BLOOD PRESSURE: 124 MMHG | HEART RATE: 74 BPM

## 2025-07-07 DIAGNOSIS — I12.9 HYPERTENSIVE NEPHROSCLEROSIS, STAGE 1 THROUGH STAGE 4 OR UNSPECIFIED CHRONIC KIDNEY DISEASE: ICD-10-CM

## 2025-07-07 DIAGNOSIS — N18.32 CKD STAGE 3B, GFR 30-44 ML/MIN: Primary | ICD-10-CM

## 2025-07-07 PROCEDURE — 99213 OFFICE O/P EST LOW 20 MIN: CPT | Mod: S$PBB,,, | Performed by: NURSE PRACTITIONER

## 2025-07-07 PROCEDURE — 99999 PR PBB SHADOW E&M-EST. PATIENT-LVL V: CPT | Mod: PBBFAC,,, | Performed by: NURSE PRACTITIONER

## 2025-07-07 PROCEDURE — 99215 OFFICE O/P EST HI 40 MIN: CPT | Mod: PBBFAC | Performed by: NURSE PRACTITIONER

## 2025-07-07 RX ORDER — OXYCODONE AND ACETAMINOPHEN 5; 325 MG/1; MG/1
1 TABLET ORAL
COMMUNITY
Start: 2025-05-01

## 2025-07-07 RX ORDER — ENOXAPARIN SODIUM 100 MG/ML
40 INJECTION SUBCUTANEOUS
COMMUNITY
Start: 2025-04-22

## 2025-07-07 RX ORDER — MUPIROCIN 20 MG/G
OINTMENT TOPICAL
COMMUNITY
Start: 2025-03-21

## 2025-07-07 RX ORDER — FLUTICASONE PROPIONATE AND SALMETEROL 250; 50 UG/1; UG/1
1 POWDER RESPIRATORY (INHALATION) DAILY
COMMUNITY
Start: 2025-07-01

## 2025-07-07 RX ORDER — ONDANSETRON 8 MG/1
8 TABLET, FILM COATED ORAL EVERY 8 HOURS
COMMUNITY
Start: 2025-04-22

## 2025-07-31 ENCOUNTER — OFFICE VISIT (OUTPATIENT)
Dept: GASTROENTEROLOGY | Facility: CLINIC | Age: 65
End: 2025-07-31
Payer: MEDICARE

## 2025-07-31 VITALS
SYSTOLIC BLOOD PRESSURE: 117 MMHG | BODY MASS INDEX: 33.82 KG/M2 | RESPIRATION RATE: 18 BRPM | HEIGHT: 65 IN | WEIGHT: 203 LBS | DIASTOLIC BLOOD PRESSURE: 51 MMHG | HEART RATE: 72 BPM

## 2025-07-31 DIAGNOSIS — R10.13 EPIGASTRIC PAIN: Primary | ICD-10-CM

## 2025-07-31 DIAGNOSIS — K59.04 CHRONIC IDIOPATHIC CONSTIPATION: ICD-10-CM

## 2025-07-31 DIAGNOSIS — K21.00 GASTROESOPHAGEAL REFLUX DISEASE WITH ESOPHAGITIS WITHOUT HEMORRHAGE: ICD-10-CM

## 2025-07-31 PROCEDURE — 99999 PR PBB SHADOW E&M-EST. PATIENT-LVL III: CPT | Mod: PBBFAC,,,

## 2025-07-31 PROCEDURE — 99213 OFFICE O/P EST LOW 20 MIN: CPT | Mod: PBBFAC

## 2025-07-31 PROCEDURE — 99214 OFFICE O/P EST MOD 30 MIN: CPT | Mod: S$PBB,,,

## 2025-07-31 RX ORDER — LUBIPROSTONE 0.02 MG/1
24 CAPSULE ORAL 2 TIMES DAILY WITH MEALS
Qty: 180 CAPSULE | Refills: 3 | Status: SHIPPED | OUTPATIENT
Start: 2025-07-31 | End: 2026-07-31

## 2025-08-18 ENCOUNTER — HOSPITAL ENCOUNTER (OUTPATIENT)
Dept: RADIOLOGY | Facility: HOSPITAL | Age: 65
Discharge: HOME | End: 2025-08-18
Payer: MEDICARE

## 2025-08-18 ENCOUNTER — RESULTS FOLLOW-UP (OUTPATIENT)
Dept: GASTROENTEROLOGY | Facility: CLINIC | Age: 65
End: 2025-08-18
Payer: MEDICARE

## 2025-08-18 DIAGNOSIS — R10.13 EPIGASTRIC PAIN: ICD-10-CM

## 2025-08-18 LAB — CREAT SERPL-MCNC: 1.4 MG/DL (ref 0.6–1.3)

## 2025-08-18 PROCEDURE — 74177 CT ABD & PELVIS W/CONTRAST: CPT | Mod: 26,,, | Performed by: RADIOLOGY

## 2025-08-18 PROCEDURE — 25500020 PHARM REV CODE 255

## 2025-08-18 PROCEDURE — 82565 ASSAY OF CREATININE: CPT

## 2025-08-18 PROCEDURE — 74177 CT ABD & PELVIS W/CONTRAST: CPT | Mod: TC

## 2025-08-18 RX ORDER — IOPAMIDOL 755 MG/ML
100 INJECTION, SOLUTION INTRAVASCULAR
Status: COMPLETED | OUTPATIENT
Start: 2025-08-18 | End: 2025-08-18

## 2025-08-18 RX ADMIN — IOPAMIDOL 100 ML: 755 INJECTION, SOLUTION INTRAVENOUS at 09:08

## (undated) DEVICE — GLOVE PROTEXIS PI SYN SURG 8.0

## (undated) DEVICE — TROCAR ENDO Z THREAD KII 5X100

## (undated) DEVICE — APPLIER CLIP ENDO MED/LG 10MM

## (undated) DEVICE — BAG TISS RETRV MONARCH 10MM

## (undated) DEVICE — CANNULA LAP SEAL Z THRD 5X100

## (undated) DEVICE — TROCAR KII FIOS ZTHREAD 11X100

## (undated) DEVICE — WARMER BLUE HEAT SCOPE 3-12MM

## (undated) DEVICE — GLOVE 6.0 PROTEXIS PI MICRO

## (undated) DEVICE — PENCIL ELECTROSURG HOLST W/BLD

## (undated) DEVICE — GLOVE 7.0 PROTEXIS PI BLUE

## (undated) DEVICE — NDL ACCESS 14GA

## (undated) DEVICE — Device

## (undated) DEVICE — GLOVE 8 PROTEXIS PI BLUE

## (undated) DEVICE — ADHESIVE MASTISOL VIAL 48/BX

## (undated) DEVICE — IRRIGATOR ENDOSCOPY DISP.

## (undated) DEVICE — GOWN POLY REINF BRTH SLV XL

## (undated) DEVICE — HEMOSTAT SURGICEL 4X8IN

## (undated) DEVICE — ELECTRODE LAPSCP L HOOK 36CM

## (undated) DEVICE — STRIP MEDI WND CLSR 1/2X4IN

## (undated) DEVICE — SUT MONOCYRL 4-0 PS2 UND

## (undated) DEVICE — GLOVE 6.5 PROTEXIS PI BLUE